# Patient Record
Sex: FEMALE | Race: ASIAN | NOT HISPANIC OR LATINO | Employment: OTHER | URBAN - METROPOLITAN AREA
[De-identification: names, ages, dates, MRNs, and addresses within clinical notes are randomized per-mention and may not be internally consistent; named-entity substitution may affect disease eponyms.]

---

## 2018-06-25 ENCOUNTER — OFFICE VISIT CONVERTED (OUTPATIENT)
Dept: INTERNAL MEDICINE | Facility: CLINIC | Age: 23
End: 2018-06-25
Attending: INTERNAL MEDICINE

## 2019-01-02 ENCOUNTER — HOSPITAL ENCOUNTER (OUTPATIENT)
Dept: URGENT CARE | Facility: CLINIC | Age: 24
Discharge: HOME | End: 2019-01-02
Attending: EMERGENCY MEDICINE

## 2019-03-11 ENCOUNTER — HOSPITAL ENCOUNTER (OUTPATIENT)
Dept: URGENT CARE | Facility: CLINIC | Age: 24
Discharge: HOME | End: 2019-03-11
Attending: EMERGENCY MEDICINE

## 2019-03-14 LAB — BACTERIA SPEC AEROBE CULT: NORMAL

## 2021-05-16 VITALS
OXYGEN SATURATION: 100 % | BODY MASS INDEX: 25.49 KG/M2 | HEART RATE: 68 BPM | SYSTOLIC BLOOD PRESSURE: 110 MMHG | HEIGHT: 61 IN | WEIGHT: 135 LBS | DIASTOLIC BLOOD PRESSURE: 68 MMHG | RESPIRATION RATE: 12 BRPM | TEMPERATURE: 97.8 F

## 2021-09-16 ENCOUNTER — HOSPITAL ENCOUNTER (INPATIENT)
Facility: HOSPITAL | Age: 26
LOS: 1 days | Discharge: HOME | End: 2021-09-17
Attending: EMERGENCY MEDICINE | Admitting: INTERNAL MEDICINE

## 2021-09-16 ENCOUNTER — APPOINTMENT (OUTPATIENT)
Dept: GENERAL RADIOLOGY | Facility: HOSPITAL | Age: 26
End: 2021-09-16

## 2021-09-16 DIAGNOSIS — J96.01 ACUTE RESPIRATORY FAILURE WITH HYPOXIA (HCC): ICD-10-CM

## 2021-09-16 DIAGNOSIS — U07.1 PNEUMONIA DUE TO COVID-19 VIRUS: Primary | ICD-10-CM

## 2021-09-16 DIAGNOSIS — J12.82 PNEUMONIA DUE TO COVID-19 VIRUS: Primary | ICD-10-CM

## 2021-09-16 LAB
ALBUMIN SERPL-MCNC: 4.1 G/DL (ref 3.5–5.2)
ALBUMIN/GLOB SERPL: 1.2 G/DL
ALP SERPL-CCNC: 83 U/L (ref 39–117)
ALT SERPL W P-5'-P-CCNC: 32 U/L (ref 1–33)
ANION GAP SERPL CALCULATED.3IONS-SCNC: 13.5 MMOL/L (ref 5–15)
AST SERPL-CCNC: 63 U/L (ref 1–32)
BASOPHILS # BLD AUTO: 0.01 10*3/MM3 (ref 0–0.2)
BASOPHILS NFR BLD AUTO: 0.2 % (ref 0–1.5)
BILIRUB SERPL-MCNC: 0.3 MG/DL (ref 0–1.2)
BUN SERPL-MCNC: 8 MG/DL (ref 6–20)
BUN/CREAT SERPL: 9.5 (ref 7–25)
CALCIUM SPEC-SCNC: 8.4 MG/DL (ref 8.6–10.5)
CHLORIDE SERPL-SCNC: 100 MMOL/L (ref 98–107)
CO2 SERPL-SCNC: 20.5 MMOL/L (ref 22–29)
CREAT SERPL-MCNC: 0.84 MG/DL (ref 0.57–1)
CRP SERPL-MCNC: 2.98 MG/DL (ref 0–0.5)
D DIMER PPP FEU-MCNC: 0.76 MG/L (FEU) (ref 0–0.59)
D-LACTATE SERPL-SCNC: 1 MMOL/L (ref 0.5–2)
D-LACTATE SERPL-SCNC: 1.2 MMOL/L (ref 0.5–2)
DEPRECATED RDW RBC AUTO: 37.1 FL (ref 37–54)
EOSINOPHIL # BLD AUTO: 0 10*3/MM3 (ref 0–0.4)
EOSINOPHIL NFR BLD AUTO: 0 % (ref 0.3–6.2)
ERYTHROCYTE [DISTWIDTH] IN BLOOD BY AUTOMATED COUNT: 12.1 % (ref 12.3–15.4)
FERRITIN SERPL-MCNC: 421.7 NG/ML (ref 13–150)
GFR SERPL CREATININE-BSD FRML MDRD: 81 ML/MIN/1.73
GLOBULIN UR ELPH-MCNC: 3.3 GM/DL
GLUCOSE SERPL-MCNC: 122 MG/DL (ref 65–99)
HCT VFR BLD AUTO: 42.9 % (ref 34–46.6)
HGB BLD-MCNC: 14.3 G/DL (ref 12–15.9)
HOLD SPECIMEN: NORMAL
HOLD SPECIMEN: NORMAL
IMM GRANULOCYTES # BLD AUTO: 0.02 10*3/MM3 (ref 0–0.05)
IMM GRANULOCYTES NFR BLD AUTO: 0.4 % (ref 0–0.5)
LDH SERPL-CCNC: 421 U/L (ref 135–214)
LYMPHOCYTES # BLD AUTO: 0.98 10*3/MM3 (ref 0.7–3.1)
LYMPHOCYTES NFR BLD AUTO: 18 % (ref 19.6–45.3)
MCH RBC QN AUTO: 27.8 PG (ref 26.6–33)
MCHC RBC AUTO-ENTMCNC: 33.3 G/DL (ref 31.5–35.7)
MCV RBC AUTO: 83.3 FL (ref 79–97)
MONOCYTES # BLD AUTO: 0.34 10*3/MM3 (ref 0.1–0.9)
MONOCYTES NFR BLD AUTO: 6.3 % (ref 5–12)
NEUTROPHILS NFR BLD AUTO: 4.09 10*3/MM3 (ref 1.7–7)
NEUTROPHILS NFR BLD AUTO: 75.1 % (ref 42.7–76)
NRBC BLD AUTO-RTO: 0 /100 WBC (ref 0–0.2)
PLATELET # BLD AUTO: 175 10*3/MM3 (ref 140–450)
PMV BLD AUTO: 9.2 FL (ref 6–12)
POTASSIUM SERPL-SCNC: 3.3 MMOL/L (ref 3.5–5.2)
PROT SERPL-MCNC: 7.4 G/DL (ref 6–8.5)
QT INTERVAL: 330 MS
RBC # BLD AUTO: 5.15 10*6/MM3 (ref 3.77–5.28)
SARS-COV-2 N GENE RESP QL NAA+PROBE: DETECTED
SODIUM SERPL-SCNC: 134 MMOL/L (ref 136–145)
WBC # BLD AUTO: 5.44 10*3/MM3 (ref 3.4–10.8)
WHOLE BLOOD HOLD SPECIMEN: NORMAL
WHOLE BLOOD HOLD SPECIMEN: NORMAL

## 2021-09-16 PROCEDURE — 94799 UNLISTED PULMONARY SVC/PX: CPT

## 2021-09-16 PROCEDURE — 82728 ASSAY OF FERRITIN: CPT | Performed by: FAMILY MEDICINE

## 2021-09-16 PROCEDURE — 93005 ELECTROCARDIOGRAM TRACING: CPT

## 2021-09-16 PROCEDURE — 93005 ELECTROCARDIOGRAM TRACING: CPT | Performed by: EMERGENCY MEDICINE

## 2021-09-16 PROCEDURE — 83605 ASSAY OF LACTIC ACID: CPT | Performed by: EMERGENCY MEDICINE

## 2021-09-16 PROCEDURE — 63710000001 DEXAMETHASONE PER 0.25 MG: Performed by: FAMILY MEDICINE

## 2021-09-16 PROCEDURE — 85025 COMPLETE CBC W/AUTO DIFF WBC: CPT | Performed by: EMERGENCY MEDICINE

## 2021-09-16 PROCEDURE — 85379 FIBRIN DEGRADATION QUANT: CPT | Performed by: FAMILY MEDICINE

## 2021-09-16 PROCEDURE — 93010 ELECTROCARDIOGRAM REPORT: CPT | Performed by: INTERNAL MEDICINE

## 2021-09-16 PROCEDURE — 71045 X-RAY EXAM CHEST 1 VIEW: CPT

## 2021-09-16 PROCEDURE — 25010000002 CEFTRIAXONE PER 250 MG: Performed by: EMERGENCY MEDICINE

## 2021-09-16 PROCEDURE — 83605 ASSAY OF LACTIC ACID: CPT | Performed by: FAMILY MEDICINE

## 2021-09-16 PROCEDURE — 94760 N-INVAS EAR/PLS OXIMETRY 1: CPT

## 2021-09-16 PROCEDURE — 83615 LACTATE (LD) (LDH) ENZYME: CPT | Performed by: FAMILY MEDICINE

## 2021-09-16 PROCEDURE — 25010000002 AZITHROMYCIN PER 500 MG: Performed by: FAMILY MEDICINE

## 2021-09-16 PROCEDURE — 36415 COLL VENOUS BLD VENIPUNCTURE: CPT

## 2021-09-16 PROCEDURE — 99285 EMERGENCY DEPT VISIT HI MDM: CPT

## 2021-09-16 PROCEDURE — 87040 BLOOD CULTURE FOR BACTERIA: CPT | Performed by: EMERGENCY MEDICINE

## 2021-09-16 PROCEDURE — 94640 AIRWAY INHALATION TREATMENT: CPT

## 2021-09-16 PROCEDURE — 86140 C-REACTIVE PROTEIN: CPT | Performed by: FAMILY MEDICINE

## 2021-09-16 PROCEDURE — 87635 SARS-COV-2 COVID-19 AMP PRB: CPT | Performed by: EMERGENCY MEDICINE

## 2021-09-16 PROCEDURE — 25010000002 ENOXAPARIN PER 10 MG: Performed by: FAMILY MEDICINE

## 2021-09-16 PROCEDURE — 99223 1ST HOSP IP/OBS HIGH 75: CPT | Performed by: FAMILY MEDICINE

## 2021-09-16 PROCEDURE — 25010000002 CEFTRIAXONE PER 250 MG: Performed by: FAMILY MEDICINE

## 2021-09-16 PROCEDURE — 25010000002 DEXAMETHASONE PER 1 MG: Performed by: EMERGENCY MEDICINE

## 2021-09-16 PROCEDURE — 80053 COMPREHEN METABOLIC PANEL: CPT | Performed by: EMERGENCY MEDICINE

## 2021-09-16 RX ORDER — ACETAMINOPHEN 500 MG
1000 TABLET ORAL ONCE
Status: DISCONTINUED | OUTPATIENT
Start: 2021-09-16 | End: 2021-09-16

## 2021-09-16 RX ORDER — SODIUM CHLORIDE 0.9 % (FLUSH) 0.9 %
10 SYRINGE (ML) INJECTION AS NEEDED
Status: DISCONTINUED | OUTPATIENT
Start: 2021-09-16 | End: 2021-09-17 | Stop reason: HOSPADM

## 2021-09-16 RX ORDER — BUDESONIDE 0.5 MG/2ML
0.5 INHALANT ORAL
Status: DISCONTINUED | OUTPATIENT
Start: 2021-09-16 | End: 2021-09-17 | Stop reason: HOSPADM

## 2021-09-16 RX ORDER — POTASSIUM CHLORIDE 750 MG/1
40 CAPSULE, EXTENDED RELEASE ORAL 2 TIMES DAILY WITH MEALS
Status: COMPLETED | OUTPATIENT
Start: 2021-09-16 | End: 2021-09-16

## 2021-09-16 RX ORDER — ARFORMOTEROL TARTRATE 15 UG/2ML
15 SOLUTION RESPIRATORY (INHALATION)
Status: DISCONTINUED | OUTPATIENT
Start: 2021-09-16 | End: 2021-09-17 | Stop reason: HOSPADM

## 2021-09-16 RX ORDER — DEXAMETHASONE SODIUM PHOSPHATE 10 MG/ML
6 INJECTION INTRAMUSCULAR; INTRAVENOUS DAILY
Status: DISCONTINUED | OUTPATIENT
Start: 2021-09-16 | End: 2021-09-17 | Stop reason: HOSPADM

## 2021-09-16 RX ORDER — AZITHROMYCIN 250 MG/1
500 TABLET, FILM COATED ORAL ONCE
Status: COMPLETED | OUTPATIENT
Start: 2021-09-16 | End: 2021-09-16

## 2021-09-16 RX ORDER — ACETAMINOPHEN 325 MG/1
975 TABLET ORAL ONCE
Status: COMPLETED | OUTPATIENT
Start: 2021-09-16 | End: 2021-09-16

## 2021-09-16 RX ORDER — GUAIFENESIN AND CODEINE PHOSPHATE 100; 10 MG/5ML; MG/5ML
5 SOLUTION ORAL EVERY 4 HOURS PRN
Status: DISCONTINUED | OUTPATIENT
Start: 2021-09-16 | End: 2021-09-17 | Stop reason: HOSPADM

## 2021-09-16 RX ORDER — ACETAMINOPHEN 325 MG/1
650 TABLET ORAL EVERY 4 HOURS PRN
Status: DISCONTINUED | OUTPATIENT
Start: 2021-09-16 | End: 2021-09-17 | Stop reason: HOSPADM

## 2021-09-16 RX ORDER — DEXAMETHASONE SODIUM PHOSPHATE 10 MG/ML
10 INJECTION INTRAMUSCULAR; INTRAVENOUS ONCE
Status: COMPLETED | OUTPATIENT
Start: 2021-09-16 | End: 2021-09-16

## 2021-09-16 RX ADMIN — SODIUM CHLORIDE 1 G: 9 INJECTION, SOLUTION INTRAVENOUS at 03:43

## 2021-09-16 RX ADMIN — POTASSIUM CHLORIDE 40 MEQ: 10 CAPSULE, COATED, EXTENDED RELEASE ORAL at 10:39

## 2021-09-16 RX ADMIN — AZITHROMYCIN MONOHYDRATE 500 MG: 500 INJECTION, POWDER, LYOPHILIZED, FOR SOLUTION INTRAVENOUS at 10:39

## 2021-09-16 RX ADMIN — ARFORMOTEROL TARTRATE 15 MCG: 15 SOLUTION RESPIRATORY (INHALATION) at 13:02

## 2021-09-16 RX ADMIN — GUAIFENESIN AND CODEINE PHOSPHATE 5 ML: 10; 100 LIQUID ORAL at 17:10

## 2021-09-16 RX ADMIN — BUDESONIDE 0.5 MG: 0.5 INHALANT ORAL at 22:29

## 2021-09-16 RX ADMIN — SODIUM CHLORIDE 1000 ML: 9 INJECTION, SOLUTION INTRAVENOUS at 03:43

## 2021-09-16 RX ADMIN — ARFORMOTEROL TARTRATE 15 MCG: 15 SOLUTION RESPIRATORY (INHALATION) at 22:29

## 2021-09-16 RX ADMIN — BUDESONIDE 0.5 MG: 0.5 INHALANT ORAL at 13:02

## 2021-09-16 RX ADMIN — SODIUM CHLORIDE 1 G: 900 INJECTION INTRAVENOUS at 16:49

## 2021-09-16 RX ADMIN — ENOXAPARIN SODIUM 40 MG: 40 INJECTION SUBCUTANEOUS at 10:39

## 2021-09-16 RX ADMIN — POTASSIUM CHLORIDE 40 MEQ: 10 CAPSULE, COATED, EXTENDED RELEASE ORAL at 16:49

## 2021-09-16 RX ADMIN — AZITHROMYCIN MONOHYDRATE 500 MG: 250 TABLET ORAL at 03:43

## 2021-09-16 RX ADMIN — DEXAMETHASONE SODIUM PHOSPHATE 10 MG: 10 INJECTION INTRAMUSCULAR; INTRAVENOUS at 03:43

## 2021-09-16 RX ADMIN — REMDESIVIR 200 MG: 100 INJECTION, POWDER, LYOPHILIZED, FOR SOLUTION INTRAVENOUS at 18:01

## 2021-09-16 RX ADMIN — DEXAMETHASONE 6 MG: 0.5 TABLET ORAL at 10:39

## 2021-09-16 RX ADMIN — ACETAMINOPHEN 975 MG: 325 TABLET ORAL at 02:29

## 2021-09-16 NOTE — CASE MANAGEMENT/SOCIAL WORK
Discharge Planning Assessment   Precious     Patient Name: Princess Larios  MRN: [id number]  Today's Date: 9/16/2021    Admit Date: 9/16/2021    Discharge Needs Assessment     Row Name 09/16/21 7114       Living Environment    Lives With  spouse    Name(s) of Who Lives With Patient  Patient reports she lives with her  Larry Young    Current Living Arrangements  home/apartment/condo    Primary Care Provided by  self    Provides Primary Care For  no one    Quality of Family Relationships  helpful;involved;supportive       Resource/Environmental Concerns    Resource/Environmental Concerns  none    Transportation Concerns  car, none       Transition Planning    Patient/Family Anticipates Transition to  home    Patient/Family Anticipated Services at Transition  durable medical equipment    Transportation Anticipated  family or friend will provide       Discharge Needs Assessment    Readmission Within the Last 30 Days  no previous admission in last 30 days    Equipment Currently Used at Home  none    Concerns to be Addressed  no discharge needs identified;denies needs/concerns at this time    Anticipated Changes Related to Illness  none    Equipment Needed After Discharge  oxygen;pulse ox;nebulizer    Discharge Coordination/Progress  Patient is covid positive as of 9/16. pt has not been vaccinated. Pt reports she lives with her  at home. Pt is currently on 2 liters of oxygen and does not use at home. Pt does not have PCP, she is agreeable to one at discharge. Pt uses [facility] as her pharmacy. pt denied any concerns with affording her medications or with transportation. pt plans to return home at discharge.        Discharge Plan    No documentation.       Continued Care and Services - Admitted Since 9/16/2021    Coordination has not been started for this encounter.         Demographic Summary     Row Name 09/16/21 1547       General Information    Admission Type  inpatient    Arrived From  home    Referral  Source  admission list    Preferred Language  English       Contact Information    Permission Granted to Share Info With  family/designee        Functional Status     Row Name 09/16/21 9570       Functional Status    Usual Activity Tolerance  excellent    Current Activity Tolerance  good       Functional Status, IADL    Medications  independent    Meal Preparation  independent    Housekeeping  independent    Laundry  independent    Shopping  independent    IADL Comments  Pt reported that her  does help her get dresed, and walk sometimes as needed at home.       Mental Status Summary    Recent Changes in Mental Status/Cognitive Functioning  no changes        Psychosocial    No documentation.       Abuse/Neglect    No documentation.       Legal    No documentation.       Substance Abuse    No documentation.       Patient Forms    No documentation.           JESSE Valencia

## 2021-09-16 NOTE — ED NOTES
WALKED PT IN ROOM FOR ABOUT 2-3 MINUTES, OXYGEN DROPPED TO 87% RA. WHEN PLACED BACK INTO STRETCHER, OXYGEN DROPPED TO 85% RA. PROVIDER NOTIFIED.      Rose Mary Gomez, PCT  09/16/21 0353

## 2021-09-16 NOTE — PLAN OF CARE
Goal Outcome Evaluation:  Plan of Care Reviewed With: patient   Pt admitted to unit from ED this morning. On 2L o2 satting 92-94%, AKBAR. No complaints this shift. Possible DC in the next 24-48 hours if remains stable on 2L o2.     Progress: improving

## 2021-09-16 NOTE — H&P
[facility]  Date: [facility]   Patient Name: Princess Larios  : 1995  MRN: [id number]  Primary Care Physician:  Rafa, No Known  Date of admission: 2021    Subjective   Subjective     Chief Complaint: Shortness of breath    HPI:    Princess Larios is a 26 y.o. female who presents to the hospital with shortness of breath cough and fever for 3 days.  Patient reportedly had a close contact exposure to COVID-19.  She has developed shortness of breath cough and fever and this is been getting progressively worse so she presented to the emergency department.  In the ER she was 85% on room air at rest.  Due to this she required supplemental oxygen.      Personal History     Past Medical History:  History reviewed. No pertinent past medical history.      Past Surgical History:  History reviewed. No pertinent surgical history.      Family History:   History reviewed. No pertinent family history.      Social History:   Social History     Tobacco Use   • Smoking status: Never Smoker   Substance Use Topics   • Alcohol use: Never   • Drug use: Never         Home Medications:       Allergies:  No Known Allergies    Review of Systems  Review of systems reviewed in its entirety and found to be negative for any changes in the last 2-4 weeks except noted in HPI or above              .    Objective   Objective     Vitals:   Temp:  [100.3 °F (37.9 °C)-103.1 °F (39.5 °C)] 100.3 °F (37.9 °C)  Heart Rate:  [79-96] 79  Resp:  [18-26] 26  BP: ()/(55-67) 100/64  Flow (L/min):  [2] 2    Physical Exam    Constitutional: Awake, alert, no acute distress, appears diaphoretic and uncomfortable   Eyes: Pupils equal, sclerae anicteric, no conjunctival injection   HENT: NCAT, mucous membranes moist   Neck: Supple, no thyromegaly, no lymphadenopathy, trachea midline   Respiratory: bilateral rales, nonlabored respirations    Cardiovascular: RRR, no murmurs, rubs, or gallops, palpable pedal pulses  bilaterally   Gastrointestinal: Positive bowel sounds, soft, nontender, nondistended   Musculoskeletal: No bilateral ankle edema, no clubbing or cyanosis to extremities   Psychiatric: Appropriate affect, cooperative   Neurologic: Oriented x 3, strength symmetric in all extremities, Cranial Nerves grossly intact to confrontation, speech clear   Skin: No rashes     Result Review    Result Review:  I have personally reviewed the results from the time of this admission to 9/16/2021 05:20 EDT and agree with these findings:  [x]  Laboratory  []  Microbiology  [x]  Radiology  [x]  EKG/Telemetry   []  Cardiology/Vascular   []  Pathology  []  Old records  []  Other:      Assessment/Plan   Assessment / Plan     Assessment/Plan:   • Acute hypoxic respiratory failure  • COVID-19 pneumonia  • Obesity with a BMI of 29  • Hyponatremia  • Hypokalemia    Plan:  Patient is admitted to a monitored bed for further medical management  Systemic corticosteroids  Supplemental oxygen. Titrate oxygen for saturation of 92 % or greater.    I reviewed the patient's chest imaging.  Evidence of multifocal pneumonia  Follow CBC for surveillance of acute blood loss or thrombocytopenia   Metabolic panel in the morning to evaluate electrolytes and renal function  Reviewed today's labs  Reviewed telemetry  Discussed with ER physician  DVT Prophylaxis: Lovenox  CODE STATUS: Full code  Risk of primary complaint: patient is at significant risk of morbidity if primary complaint is not treated especially considering the patient's comorbidities.  DVT prophylaxis:  Medical DVT prophylaxis orders are present.    CODE STATUS:    Code Status: CPR  Medical Interventions (Level of Support Prior to Arrest): Full      Admission Status:  I believe this patient meets inpatient status.    Electronically signed by Joel Michaels DO, 09/16/21, 5:20 AM EDT.

## 2021-09-16 NOTE — ED PROVIDER NOTES
Time: 2:40 AM EDT  Arrived by: private car  History provided by: pt  History is limited by: N/A     History of Present Illness:  Patient is a 26 y.o. year old female that presents to the emergency department with SOB.    Pt states she is positive for COVID-19 for about one week. Pt complains of SOB, diarrhea, fatigue, and fever. Pt denies any nausea or vomiting.      History provided by:  Patient   used: No    Shortness of Breath  Severity:  Moderate  Onset quality:  Gradual  Duration:  1 week  Timing:  Constant  Progression:  Worsening  Chronicity:  New  Context comment:  COVID-19  Relieved by:  Nothing  Worsened by:  Nothing  Ineffective treatments: OTC.  Associated symptoms: fever    Associated symptoms: no abdominal pain, no ear pain, no headaches, no neck pain, no rash, no sore throat and no vomiting    Fatigue  Associated symptoms: diarrhea, fatigue, fever and shortness of breath    Associated symptoms: no abdominal pain, no congestion, no ear pain, no headaches, no nausea, no rash, no rhinorrhea, no sore throat and no vomiting    Fever  Associated symptoms: diarrhea    Associated symptoms: no chills, no confusion, no congestion, no dysuria, no ear pain, no headaches, no nausea, no rash, no rhinorrhea, no sore throat and no vomiting        Similar Symptoms Previously: none  Recently seen: not recently seen in this ED    Patient Care Team  Primary Care Provider: Provider, No Known    Past Medical History:     No Known Allergies  History reviewed. No pertinent past medical history.  History reviewed. No pertinent surgical history.  History reviewed. No pertinent family history.    Home Medications:  Prior to Admission medications    Not on File        Social History:   Social History     Tobacco Use   • Smoking status: Never Smoker   Substance Use Topics   • Alcohol use: Never   • Drug use: Never     Recent travel: not applicable     Review of Systems:  Review of Systems   Constitutional:  "Positive for fatigue and fever. Negative for chills.   HENT: Negative for congestion, ear pain, rhinorrhea, sore throat, tinnitus and trouble swallowing.    Eyes: Negative for photophobia and pain.   Respiratory: Positive for shortness of breath. Negative for choking.    Gastrointestinal: Positive for diarrhea. Negative for abdominal pain, nausea and vomiting.   Endocrine: Negative for polydipsia.   Genitourinary: Negative for difficulty urinating, dysuria and hematuria.   Musculoskeletal: Negative for back pain, joint swelling and neck pain.   Skin: Negative for rash.   Neurological: Negative for dizziness, seizures, speech difficulty, weakness, light-headedness, numbness and headaches.   Hematological: Negative for adenopathy.   Psychiatric/Behavioral: Negative for behavioral problems, confusion, self-injury and suicidal ideas.        Physical Exam:  /58   Pulse 80   Temp 100.3 °F (37.9 °C) (Oral)   Resp 26   Ht 152.4 cm (60\")   Wt 67.9 kg (149 lb 11.1 oz)   LMP 09/06/2021   SpO2 98%   BMI 29.23 kg/m²     Physical Exam  Vitals and nursing note reviewed.   Constitutional:       General: She is awake.      Appearance: Normal appearance. She is ill-appearing.   HENT:      Head: Normocephalic and atraumatic.      Right Ear: External ear normal.      Left Ear: External ear normal.      Nose: Nose normal.      Mouth/Throat:      Mouth: Mucous membranes are moist.      Pharynx: Oropharynx is clear.   Eyes:      General: Lids are normal.      Extraocular Movements: Extraocular movements intact.      Conjunctiva/sclera: Conjunctivae normal.      Pupils: Pupils are equal, round, and reactive to light.   Cardiovascular:      Rate and Rhythm: Normal rate and regular rhythm.      Pulses: Normal pulses.      Heart sounds: Normal heart sounds.   Pulmonary:      Effort: Pulmonary effort is normal.      Breath sounds: Normal air entry. Rhonchi present.   Abdominal:      Palpations: Abdomen is soft.   Musculoskeletal: "         General: Normal range of motion.      Cervical back: Full passive range of motion without pain, normal range of motion and neck supple.   Skin:     General: Skin is warm and dry.   Neurological:      General: No focal deficit present.      Mental Status: She is alert and oriented to person, place, and time. Mental status is at baseline.      Cranial Nerves: Cranial nerves are intact.      Sensory: Sensation is intact.      Motor: Motor function is intact.      Coordination: Coordination is intact.   Psychiatric:         Attention and Perception: Attention and perception normal.         Mood and Affect: Mood and affect normal.         Speech: Speech normal.         Behavior: Behavior normal. Behavior is cooperative.         Thought Content: Thought content normal.         Cognition and Memory: Cognition and memory normal.                Medications in the Emergency Department:  Medications   sodium chloride 0.9 % flush 10 mL (has no administration in time range)   enoxaparin (LOVENOX) syringe 40 mg (has no administration in time range)   dexamethasone (DECADRON) tablet 6 mg (has no administration in time range)     Or   dexamethasone (DECADRON) injection 6 mg (has no administration in time range)   cefTRIAXone (ROCEPHIN) 1 g/100 mL 0.9% NS (MBP) (has no administration in time range)   AZITHROMYCIN 500 MG/250 ML 0.9% NS IVPB (vial-mate) (has no administration in time range)   arformoterol (BROVANA) nebulizer solution 15 mcg (has no administration in time range)   budesonide (PULMICORT) nebulizer solution 0.5 mg (has no administration in time range)   acetaminophen (TYLENOL) tablet 975 mg (975 mg Oral Given 9/16/21 0229)   sodium chloride 0.9 % bolus 1,000 mL (0 mL Intravenous Stopped 9/16/21 0413)   cefTRIAXone (ROCEPHIN) 1 g/100 mL 0.9% NS (MBP) (0 g Intravenous Stopped 9/16/21 0413)   azithromycin (ZITHROMAX) tablet 500 mg (500 mg Oral Given 9/16/21 0343)   dexamethasone (DECADRON) injection 10 mg (10 mg  Intravenous Given 9/16/21 0343)        Labs  Lab Results (last 24 hours)     Procedure Component Value Units Date/Time    CBC & Differential [[id number]]  (Abnormal) Collected: 09/16/21 0234    Specimen: Blood from Arm, Left Updated: 09/16/21 0253    Narrative:      The following orders were created for panel order CBC & Differential.  Procedure                               Abnormality         Status                     ---------                               -----------         ------                     CBC Auto Differential[[id number]]        Abnormal            Final result                 Please view results for these tests on the individual orders.    Comprehensive Metabolic Panel [[id number]]  (Abnormal) Collected: 09/16/21 0234    Specimen: Blood from Arm, Left Updated: 09/16/21 0314     Glucose 122 mg/dL      BUN 8 mg/dL      Creatinine 0.84 mg/dL      Sodium 134 mmol/L      Potassium 3.3 mmol/L      Chloride 100 mmol/L      CO2 20.5 mmol/L      Calcium 8.4 mg/dL      Total Protein 7.4 g/dL      Albumin 4.10 g/dL      ALT (SGPT) 32 U/L      AST (SGOT) 63 U/L      Alkaline Phosphatase 83 U/L      Total Bilirubin 0.3 mg/dL      eGFR Non African Amer 81 mL/min/1.73      Globulin 3.3 gm/dL      A/G Ratio 1.2 g/dL      BUN/Creatinine Ratio 9.5     Anion Gap 13.5 mmol/L     Narrative:      GFR Normal >60  Chronic Kidney Disease <60  Kidney Failure <15      Lactic Acid, Plasma [[id number]]  (Normal) Collected: 09/16/21 0234    Specimen: Blood from Arm, Left Updated: 09/16/21 0310     Lactate 1.2 mmol/L     Blood Culture - Blood, Arm, Left [[id number]] Collected: 09/16/21 0234    Specimen: Blood from Arm, Left Updated: 09/16/21 0248    CBC Auto Differential [[id number]]  (Abnormal) Collected: 09/16/21 0234    Specimen: Blood from Arm, Left Updated: 09/16/21 0253     WBC 5.44 10*3/mm3      RBC 5.15 10*6/mm3      Hemoglobin 14.3 g/dL      Hematocrit 42.9 %      MCV 83.3 fL      MCH 27.8 pg      MCHC 33.3 g/dL      RDW  12.1 %      RDW-SD 37.1 fl      MPV 9.2 fL      Platelets 175 10*3/mm3      Neutrophil % 75.1 %      Lymphocyte % 18.0 %      Monocyte % 6.3 %      Eosinophil % 0.0 %      Basophil % 0.2 %      Immature Grans % 0.4 %      Neutrophils, Absolute 4.09 10*3/mm3      Lymphocytes, Absolute 0.98 10*3/mm3      Monocytes, Absolute 0.34 10*3/mm3      Eosinophils, Absolute 0.00 10*3/mm3      Basophils, Absolute 0.01 10*3/mm3      Immature Grans, Absolute 0.02 10*3/mm3      nRBC 0.0 /100 WBC     Ferritin [[id number]] Collected: 09/16/21 0234    Specimen: Blood from Arm, Left Updated: 09/16/21 0525    Lactate Dehydrogenase [[id number]] Collected: 09/16/21 0234    Specimen: Blood from Arm, Left Updated: 09/16/21 0525    D-dimer, Quantitative [[id number]]  (Abnormal) Collected: 09/16/21 0234    Specimen: Blood from Arm, Left Updated: 09/16/21 0538     D-Dimer, Quantitative 0.76 mg/L (FEU)     Narrative:      Effective 6/30/09 new normal reference range: <= 0.59 mg/L FEU  Increases in D-dimer concentration observed with  thromboembolic events can be variable due to localization,  size and age of the thrombus. Therefore, a thromboembolic  event cannot be diagnosed with certainty on the basis of the  reference range.  D-dimers may also be elevated for a variety of disorders   including: advanced age, pregnancy, coronary disease, cancer,  liver disease, infection, inflammation, hematoma, DIC, trauma,  post surgery, diabetes, thrombolytic therapy, stress, and  general hospitalization. D-dimer levels may be decreased in  patients on anticoagulant therapy.    C-reactive Protein [[id number]] Collected: 09/16/21 0234    Specimen: Blood from Arm, Left Updated: 09/16/21 0525    COVID-19,CEPHEID/JORGE/BDMAX,COR/ALMA ROSA/PAD/PATY IN-HOUSE(OR EMERGENT/ADD-ON),NP SWAB IN TRANSPORT MEDIA 3-4 HR TAT, RT-PCR - Swab, Nasopharynx [[id number]] Collected: 09/16/21 0237    Specimen: Swab from Nasopharynx Updated: 09/16/21 0248    Blood Culture - Blood, Arm, Right  [[id number]] Collected: 09/16/21 0248    Specimen: Blood from Arm, Right Updated: 09/16/21 0257           Imaging:  XR Chest 1 View    Result Date: 9/16/2021  PROCEDURE: XR CHEST 1 VW  COMPARISON: None  INDICATIONS: SHORTNESS OF BREATH, FATIGUE, FEVER  FINDINGS:  There are patchy airspace opacities in both lungs.  No pneumothorax or large pleural effusion is seen.  Cardiac silhouette appears mildly enlarged, but is exaggerated by portable AP technique.  CONCLUSION: Patchy airspace opacities in both lungs, concerning for multifocal pneumonia.       MARCELA FARRELL MD       Electronically Signed and Approved By: MARCELA FARRELL MD on 9/16/2021 at 3:05               Procedures:  Procedures    Progress  ED Course as of Sep 16 0708   Thu Sep 16, 2021   0241 EKG: Sinus rhythm at 92, nml P wave, nml QRS, non specific ST changes, nml QT - no previous    [RK]      ED Course User Index  [RK] Uma Valerio                            Medical Decision Making:  MDM   Sepsis criteria was met in the emergency department and the Sepsis protocol (including antibiotic administration) was initiated.      SIRS criteria considered:   1.  Temperature > 100.4 or <98.6    2.  Heart Rate > 90    3.  Respiratory Rate > 22    4.  WBC > 12K or <4K.             Severe Sepsis:     Respiratory: Mechanical Ventilation or Bipap  Hypotension: SBP > 90 or MAP < 65  Renal: Creatinine > 2  Metabolic: Lactic Acid > 2  Hematologic: Platelets < 100K or INR > 1.5  Hepatic: BILI  >  2  CNS: Sudden AMS     Septic Shock:     Severe Sepsis + Persistent hypotension or Lactic Acid > 4     Normal saline bolus, Antibiotics, and final disposition was based on these definitions.        Sepsis was recognized at 0315.    Antibiotics were ordered.     30 cc/kg bolus was indicated.      The patient was ordered 1 L of fluids.    Total Critical Care time of 31 minutes. Total critical care time documented does not include time spent on separately billed procedures for  services of nurses or physician assistants. I personally saw and examined the patient. I have reviewed all diagnostic interpretations and treatment plans as written. I was present for the key portions of any procedures performed and the inclusive time noted in any critical care statement. Critical care time includes patient management by me, time spent at the patients bedside,  time to review lab and imaging results, discussing patient care, documentation in the medical record, and time spent with family or caregiver.    Final diagnoses:   Pneumonia due to COVID-19 virus   Acute respiratory failure with hypoxia (CMS/[facility])        Disposition:  ED Disposition     ED Disposition Condition Comment    Decision to Admit  Level of Care: Telemetry [5]   Diagnosis: Pneumonia due to COVID-19 virus [[id number]]   Admitting Physician: YOLY RINALDI [[id number]]   Attending Physician: YOLY RINALDI [[id number]]   Isolate for COVID?: Yes [1]   Certification: I Certify That Inpatient Hospital Services Are Medically Necessary For Greater Than 2 Midnights            Documentation assistance provided by Uma Valerio acting as scribe for Samuel Alejandro MD. Information recorded by the scribe was done at my direction and has been verified and validated by me.          Uma Valerio  09/16/21 0306       Uma Valerio  09/16/21 0316       Samuel Alejandro MD  09/16/21 0029

## 2021-09-16 NOTE — PAYOR COMM NOTE
"Princess Ric (26 y.o. Female)     Date of Birth Social Security Number Address Home Phone MRN    1995  [address] [place] [address] [id number]    [employer] Marital Status          None Single       Admission Date Admission Type Admitting Provider Attending Provider Department, Room/Bed    9/16/21 Emergency Jos Cedillo MD Klimchak, Nicholas, MD [address], 3016/1    Discharge Date Discharge Disposition Discharge Destination                       Attending Provider: Jos Cedillo MD    Allergies: No Known Allergies    Isolation: Contact Air   Infection: COVID (confirmed) (09/16/21)   Code Status: CPR    Ht: 152.4 cm (60\")   Wt: 67.9 kg (149 lb 11.1 oz)    Admission Cmt: None   Principal Problem: None                Active Insurance as of 9/16/2021     Primary Coverage     Payor Plan Insurance Group Employer/Plan Group    [employer] [facility] [id number]     Payor Plan Address Payor Plan Phone Number Payor Plan Fax Number Effective Dates    [address] [phone]  9/16/2021 - None Entered    [facility] [id number]       Subscriber Name Subscriber Birth Date Member ID       SAKSHI NEWTON 7/23/1995 [id number]                 Emergency Contacts      (Rel.) Home Phone Work Phone Mobile Phone    SAKSHI NEWTON (Spouse) [phone] -- [phone]            {Documentation Categories:[id number]}  "

## 2021-09-17 ENCOUNTER — TELEPHONE (OUTPATIENT)
Dept: INTERNAL MEDICINE | Facility: CLINIC | Age: 26
End: 2021-09-17

## 2021-09-17 ENCOUNTER — READMISSION MANAGEMENT (OUTPATIENT)
Dept: CALL CENTER | Facility: HOSPITAL | Age: 26
End: 2021-09-17

## 2021-09-17 VITALS
BODY MASS INDEX: 29.39 KG/M2 | OXYGEN SATURATION: 91 % | HEIGHT: 60 IN | RESPIRATION RATE: 28 BRPM | SYSTOLIC BLOOD PRESSURE: 101 MMHG | WEIGHT: 149.69 LBS | HEART RATE: 72 BPM | DIASTOLIC BLOOD PRESSURE: 50 MMHG | TEMPERATURE: 99.2 F

## 2021-09-17 PROBLEM — D89.831 CYTOKINE RELEASE SYNDROME, GRADE 1: Status: ACTIVE | Noted: 2021-09-17

## 2021-09-17 LAB
ALBUMIN SERPL-MCNC: 3.5 G/DL (ref 3.5–5.2)
ALBUMIN/GLOB SERPL: 1.1 G/DL
ALP SERPL-CCNC: 71 U/L (ref 39–117)
ALT SERPL W P-5'-P-CCNC: 41 U/L (ref 1–33)
ANION GAP SERPL CALCULATED.3IONS-SCNC: 10.8 MMOL/L (ref 5–15)
AST SERPL-CCNC: 49 U/L (ref 1–32)
BASOPHILS # BLD AUTO: 0.02 10*3/MM3 (ref 0–0.2)
BASOPHILS NFR BLD AUTO: 0.2 % (ref 0–1.5)
BILIRUB SERPL-MCNC: 0.2 MG/DL (ref 0–1.2)
BUN SERPL-MCNC: 14 MG/DL (ref 6–20)
BUN/CREAT SERPL: 20.9 (ref 7–25)
CALCIUM SPEC-SCNC: 8.8 MG/DL (ref 8.6–10.5)
CHLORIDE SERPL-SCNC: 111 MMOL/L (ref 98–107)
CO2 SERPL-SCNC: 19.2 MMOL/L (ref 22–29)
CREAT SERPL-MCNC: 0.67 MG/DL (ref 0.57–1)
DEPRECATED RDW RBC AUTO: 38.8 FL (ref 37–54)
EOSINOPHIL # BLD AUTO: 0.02 10*3/MM3 (ref 0–0.4)
EOSINOPHIL NFR BLD AUTO: 0.2 % (ref 0.3–6.2)
ERYTHROCYTE [DISTWIDTH] IN BLOOD BY AUTOMATED COUNT: 12.5 % (ref 12.3–15.4)
GFR SERPL CREATININE-BSD FRML MDRD: 106 ML/MIN/1.73
GLOBULIN UR ELPH-MCNC: 3.3 GM/DL
GLUCOSE SERPL-MCNC: 134 MG/DL (ref 65–99)
HCT VFR BLD AUTO: 40.3 % (ref 34–46.6)
HGB BLD-MCNC: 13.5 G/DL (ref 12–15.9)
IMM GRANULOCYTES # BLD AUTO: 0.04 10*3/MM3 (ref 0–0.05)
IMM GRANULOCYTES NFR BLD AUTO: 0.4 % (ref 0–0.5)
LYMPHOCYTES # BLD AUTO: 1.09 10*3/MM3 (ref 0.7–3.1)
LYMPHOCYTES NFR BLD AUTO: 10 % (ref 19.6–45.3)
MAGNESIUM SERPL-MCNC: 2 MG/DL (ref 1.6–2.6)
MCH RBC QN AUTO: 28.8 PG (ref 26.6–33)
MCHC RBC AUTO-ENTMCNC: 33.5 G/DL (ref 31.5–35.7)
MCV RBC AUTO: 85.9 FL (ref 79–97)
MONOCYTES # BLD AUTO: 0.87 10*3/MM3 (ref 0.1–0.9)
MONOCYTES NFR BLD AUTO: 8 % (ref 5–12)
NEUTROPHILS NFR BLD AUTO: 8.9 10*3/MM3 (ref 1.7–7)
NEUTROPHILS NFR BLD AUTO: 81.2 % (ref 42.7–76)
NRBC BLD AUTO-RTO: 0 /100 WBC (ref 0–0.2)
PLATELET # BLD AUTO: 215 10*3/MM3 (ref 140–450)
PMV BLD AUTO: 10.1 FL (ref 6–12)
POTASSIUM SERPL-SCNC: 4.2 MMOL/L (ref 3.5–5.2)
PROT SERPL-MCNC: 6.8 G/DL (ref 6–8.5)
RBC # BLD AUTO: 4.69 10*6/MM3 (ref 3.77–5.28)
RBC MORPH BLD: NORMAL
SMALL PLATELETS BLD QL SMEAR: ADEQUATE
SODIUM SERPL-SCNC: 141 MMOL/L (ref 136–145)
WBC # BLD AUTO: 10.94 10*3/MM3 (ref 3.4–10.8)
WBC MORPH BLD: NORMAL

## 2021-09-17 PROCEDURE — 25010000002 CEFTRIAXONE PER 250 MG: Performed by: FAMILY MEDICINE

## 2021-09-17 PROCEDURE — 85025 COMPLETE CBC W/AUTO DIFF WBC: CPT | Performed by: FAMILY MEDICINE

## 2021-09-17 PROCEDURE — 94799 UNLISTED PULMONARY SVC/PX: CPT

## 2021-09-17 PROCEDURE — 83735 ASSAY OF MAGNESIUM: CPT | Performed by: INTERNAL MEDICINE

## 2021-09-17 PROCEDURE — 25010000002 ENOXAPARIN PER 10 MG: Performed by: FAMILY MEDICINE

## 2021-09-17 PROCEDURE — 25010000002 AZITHROMYCIN PER 500 MG: Performed by: FAMILY MEDICINE

## 2021-09-17 PROCEDURE — 80053 COMPREHEN METABOLIC PANEL: CPT | Performed by: FAMILY MEDICINE

## 2021-09-17 PROCEDURE — 63710000001 DEXAMETHASONE PER 0.25 MG: Performed by: FAMILY MEDICINE

## 2021-09-17 PROCEDURE — 85007 BL SMEAR W/DIFF WBC COUNT: CPT | Performed by: FAMILY MEDICINE

## 2021-09-17 PROCEDURE — 99239 HOSP IP/OBS DSCHRG MGMT >30: CPT | Performed by: INTERNAL MEDICINE

## 2021-09-17 RX ORDER — DEXAMETHASONE 6 MG/1
6 TABLET ORAL
Qty: 9 TABLET | Refills: 0 | Status: SHIPPED | OUTPATIENT
Start: 2021-09-17 | End: 2022-07-29

## 2021-09-17 RX ORDER — ALBUTEROL SULFATE 1.25 MG/3ML
1 SOLUTION RESPIRATORY (INHALATION) EVERY 6 HOURS PRN
Qty: 120 EACH | Refills: 0 | Status: SHIPPED | OUTPATIENT
Start: 2021-09-17 | End: 2022-07-29

## 2021-09-17 RX ORDER — GUAIFENESIN AND CODEINE PHOSPHATE 100; 10 MG/5ML; MG/5ML
5 SOLUTION ORAL 4 TIMES DAILY PRN
Qty: 30 ML | Refills: 0 | Status: SHIPPED | OUTPATIENT
Start: 2021-09-17 | End: 2022-07-29

## 2021-09-17 RX ORDER — BUDESONIDE AND FORMOTEROL FUMARATE DIHYDRATE 160; 4.5 UG/1; UG/1
2 AEROSOL RESPIRATORY (INHALATION)
Qty: 1 EACH | Refills: 0 | Status: SHIPPED | OUTPATIENT
Start: 2021-09-17 | End: 2022-07-29

## 2021-09-17 RX ADMIN — BUDESONIDE 0.5 MG: 0.5 INHALANT ORAL at 09:02

## 2021-09-17 RX ADMIN — SODIUM CHLORIDE 1 G: 900 INJECTION INTRAVENOUS at 11:27

## 2021-09-17 RX ADMIN — ARFORMOTEROL TARTRATE 15 MCG: 15 SOLUTION RESPIRATORY (INHALATION) at 09:02

## 2021-09-17 RX ADMIN — DEXAMETHASONE 6 MG: 0.5 TABLET ORAL at 08:54

## 2021-09-17 RX ADMIN — ENOXAPARIN SODIUM 40 MG: 40 INJECTION SUBCUTANEOUS at 08:54

## 2021-09-17 RX ADMIN — AZITHROMYCIN MONOHYDRATE 500 MG: 500 INJECTION, POWDER, LYOPHILIZED, FOR SOLUTION INTRAVENOUS at 08:54

## 2021-09-17 RX ADMIN — ACETAMINOPHEN 650 MG: 325 TABLET ORAL at 00:37

## 2021-09-17 NOTE — DISCHARGE SUMMARY
[facility]         HOSPITALIST  DISCHARGE SUMMARY    Patient Name: Princess Larios  : 1995  MRN: [id number]    Date of Admission: 2021  Date of Discharge: 2021  Primary Care Physician: Provider, No Known    Consults     Date and Time Order Name Status Description    2021  4:17 AM Hospitalist (on-call MD unless specified) Completed           Active and Resolved Hospital Problems:  Active Hospital Problems    Diagnosis POA   • Cytokine release syndrome, grade 1 [D89.831] Unknown   • Pneumonia due to COVID-19 virus [U07.1, J12.82] Yes      Resolved Hospital Problems   No resolved problems to display.   Acute hypoxemia  COVID-19 pneumonia  Obesity with BMI 29  Hyponatremia  Hypokalemia  Leukocytosis  Transaminitis    Hospital Course     Hospital Course:  Princess Larios is a 26 y.o. unvaccinated female who presente to the hospital with shortness of breath, cough and fever for 3 days.  Patient reportedly had a close contact exposure to COVID-19.  She has developed shortness of breath cough and fever and this is been getting progressively worse so she presented to the emergency department.  In the ER she was 85% on room air at rest.    She was admitted for further care.  Covid test returned positive.  She was started on steroids, remdesivir, and breathing treatments.  Clinically, she improved and never required more than 2 L nasal cannula.  She had a walk test on the day of discharge and will require oxygen going forward.  She was discharged home in stable condition on 2021.  She was given a nebulizer and oxygen at discharge as well as inhalers and steroids.  She is given strict instructions on reasons to return to the ED and recommend she get a pulse oximeter to monitor her oxygen at home.  Recommend follow-up with PCP in 1 week.      Day of Discharge     Vital Signs:  Temp:  [98.1 °F (36.7 °C)-99.4 °F (37.4 °C)] 98.9 °F (37.2 °C)  Heart Rate:  [69-91] 77  Resp:  [18-28] 28  BP:  ()/(51-64) 93/51  Flow (L/min):  [2-3] 3  Physical Exam:   Gen: NAD, WDWN, appears fatigued no acute distress  ENT: PERRL, EOMI   CV: RRR no MRG  Pulm: CTAB, nasal cannula in place, no w/r/r  GI: Abd soft, NTND, +bs  Neuro: Moving all extremities spontaneously, CN II-XII grossly intact   Psych: A&O*3, normal mood and affect  Skin: No lesions or rashes noted      Discharge Details        Discharge Medications      New Medications      Instructions Start Date   albuterol 1.25 MG/3ML nebulizer solution  Commonly known as: ACCUNEB   1.25 mg, Nebulization, Every 6 Hours PRN      budesonide-formoterol 160-4.5 MCG/ACT inhaler  Commonly known as: Symbicort   2 puffs, Inhalation, 2 Times Daily - RT      dexamethasone 6 MG tablet  Commonly known as: DECADRON   6 mg, Oral, Daily With Breakfast      guaiFENesin-codeine 100-10 MG/5ML liquid  Commonly known as: guaiFENesin AC   5 mL, Oral, 4 Times Daily PRN             No Known Allergies    Discharge Disposition:  Home or Self Care    Diet:  Hospital:  Diet Order   Procedures   • Diet Regular; Consistent Carbohydrate       Discharge Activity:   Activity Instructions     Activity as Tolerated            CODE STATUS:  Code Status and Medical Interventions:   Ordered at: 09/16/21 0455     Code Status:    CPR     Medical Interventions (Level of Support Prior to Arrest):    Full         No future appointments.    Additional Instructions for the Follow-ups that You Need to Schedule     Discharge Follow-up with PCP   As directed       Currently Documented PCP:    Provider, No Known    PCP Phone Number:    None     Follow Up Details: 3-5 days               Pertinent  and/or Most Recent Results     PROCEDURES:   None    LAB RESULTS:      Lab 09/17/21  0812 09/16/21  1025 09/16/21  0234   WBC 10.94*  --  5.44   HEMOGLOBIN 13.5  --  14.3   HEMATOCRIT 40.3  --  42.9   PLATELETS 215  --  175   NEUTROS ABS 8.90*  --  4.09   IMMATURE GRANS (ABS) 0.04  --  0.02   LYMPHS ABS 1.09  --  0.98    MONOS ABS 0.87  --  0.34   EOS ABS 0.02  --  0.00   MCV 85.9  --  83.3   CRP  --   --  2.98*   LACTATE  --  1.0 1.2   LDH  --   --  421*         Lab 09/17/21  0812 09/16/21  0234   SODIUM 141 134*   POTASSIUM 4.2 3.3*   CHLORIDE 111* 100   CO2 19.2* 20.5*   ANION GAP 10.8 13.5   BUN 14 8   CREATININE 0.67 0.84   GLUCOSE 134* 122*   CALCIUM 8.8 8.4*   MAGNESIUM 2.0  --          Lab 09/17/21  0812 09/16/21  0234   TOTAL PROTEIN 6.8 7.4   ALBUMIN 3.50 4.10   GLOBULIN 3.3 3.3   ALT (SGPT) 41* 32   AST (SGOT) 49* 63*   BILIRUBIN 0.2 0.3   ALK PHOS 71 83                 Lab 09/16/21  0234   FERRITIN 421.70*         Brief Urine Lab Results     None        Microbiology Results (last 10 days)     Procedure Component Value - Date/Time    Blood Culture - Blood, Arm, Right [[id number]] Collected: 09/16/21 0248    Lab Status: Preliminary result Specimen: Blood from Arm, Right Updated: 09/17/21 0300     Blood Culture No growth at 24 hours    COVID-19,CEPHEID/JORGE/BDMAX,COR/ALMA ROSA/PAD/PATY IN-HOUSE(OR EMERGENT/ADD-ON),NP SWAB IN TRANSPORT MEDIA 3-4 HR TAT, RT-PCR - Swab, Nasopharynx [[id number]]  (Abnormal) Collected: 09/16/21 0237    Lab Status: Final result Specimen: Swab from Nasopharynx Updated: 09/16/21 1014     COVID19 Detected    Narrative:      Fact sheet for providers: https://www.fda.gov/media/243497/download     Fact sheet for patients: https://www.fda.gov/media/068753/download  Presumptive Positive: additional testing may be indicated if it is necessary to differentiate between SARS-CoV-2 and other Sarbecovirus, for epidemiological purposes, or clinical management.    Blood Culture - Blood, Arm, Left [[id number]] Collected: 09/16/21 0234    Lab Status: Preliminary result Specimen: Blood from Arm, Left Updated: 09/17/21 0300     Blood Culture No growth at 24 hours                           Labs Pending at Discharge:  Pending Labs     Order Current Status    Blood Culture - Blood, Arm, Left Preliminary result    Blood Culture  - Blood, Arm, Right Preliminary result            Time spent on Discharge including face to face service:  34 minutes    Electronically signed by Jos Angulo MD, 09/17/21, 12:46 PM EDT.

## 2021-09-17 NOTE — OUTREACH NOTE
Prep Survey      Responses   [facility] discharged [facility]?  [facility]   Is LACE score < 7 ?  Yes   Emergency Room discharge w/ pulse ox?  No   Eligibility  TCM   Hospital  [facility]   Date of Admission  09/16/21   Date of Discharge  09/17/21   Discharge Disposition  Home or Self Care   Discharge diagnosis  PNA due to Covid    Does the patient have one of the following disease processes/diagnoses(primary or secondary)?  COVID-19   Does the patient have Home health ordered?  No   Is there a DME ordered?  Yes   What DME was ordered?  [place] for home O2   Prep survey completed?  Yes          Tg Wasserman RN

## 2021-09-17 NOTE — NURSING NOTE
Exercise Oximetry    Patient Name:Princess Larios   MRN: [id number]   Date: 09/17/21             ROOM AIR BASELINE   SpO2% 90   Heart Rate 86   Blood Pressure      EXERCISE ON ROOM AIR SpO2% EXERCISE ON O2 @ 3 LPM SpO2%   1 MINUTE 91 1 MINUTE    2 MINUTES 88 2 MINUTES    3 MINUTES  3 MINUTES 91   4 MINUTES  4 MINUTES 94   5 MINUTES  5 MINUTES 90   6 MINUTES  6 MINUTES 92              Distance Walked  50 feet Distance Walked   Dyspnea (Gisel Scale)  Dyspnea (Gisel Scale)   Fatigue (Gisel Scale)   Fatigue (Gisel Scale)   SpO2% Post Exercise   SpO2% Post Exercise   HR Post Exercise   HR Post Exercise   Time to Recovery  3 minutes Time to Recovery     Comments:

## 2021-09-17 NOTE — SIGNIFICANT NOTE
09/17/21 1522   Plan   Final Note Cm called pts pharmacy to verify medications. Per pharmacist, pt does not have updated insurance information in system and unable to get price for pt at this time. Cm updated pt and encouraged her to take insurance cards with her when picking up her medications. Pt provided with O2 tank from [employer] and pulse ox. New pcp setup with Alfredo Bui. 9/20. Pt denies any dc needs.

## 2021-09-17 NOTE — PLAN OF CARE
Goal Outcome Evaluation:  Plan of Care Reviewed With: patient   Pt on 3L o2. Failed walk test, required 3L o2 to stay above 90%. No complaints this shift, has orders to discharge with home o2.      Progress: improving

## 2021-09-17 NOTE — TELEPHONE ENCOUNTER
Caller: LOVE    Relationship to patient:     Best call back number: [phone]    New or established patient?  [x] New  [] Established    Date of discharge: 09/17/21    Facility discharged from: [facility]    Diagnosis/Symptoms: COVID, ASYMPTOMATIC

## 2021-09-17 NOTE — SIGNIFICANT NOTE
09/17/21 1424   Plan   Plan Comments Patient to discharge home today per MD.  Pt will need home oxygen and nebulizer. Pt is agreeable to [facility]. SW sent referral to [facility]. Patient denied any other discharge needs.   Final Discharge Disposition Code 01 - home or self-care   Final Note Patient to discharge home today independently. Pt reports her  will be here to pick her up.

## 2021-09-17 NOTE — PLAN OF CARE
"Goal Outcome Evaluation:   Patient had low grade fever this shift, medicated per mar due to \"not feeling\" per patient. Otherwise stable, encouraged patient to lay prone this shift. No follow up needs at this time.               "

## 2021-09-20 ENCOUNTER — TRANSITIONAL CARE MANAGEMENT TELEPHONE ENCOUNTER (OUTPATIENT)
Dept: CALL CENTER | Facility: HOSPITAL | Age: 26
End: 2021-09-20

## 2021-09-20 NOTE — OUTREACH NOTE
Call Center TCM Note      Responses   [facility] patient discharged from?  [facility]   Does the patient have one of the following disease processes/diagnoses(primary or secondary)?  COVID-19   COVID-19 underlying condition?  None   TCM attempt successful?  Yes   Call start time  1625   Call end time  1637   Person spoke with today (if not patient) and relationship  Larry Young, spouse emergency contact   Meds reviewed with patient/caregiver?  Yes   Is the patient having any side effects they believe may be caused by any medication additions or changes?  No   Does the patient have all medications ordered at discharge?  Yes   Is the patient taking all medications as directed (includes completed medication regime)?  Yes   Does the patient have a primary care provider?   Yes   Comments regarding PCP  Hospital PCP FOLLOW UP APPOINTMENT IS 9/27/21@0800am   Does the patient have an appointment with their PCP or specialist within 7 days of discharge?  Greater than 7 days   What is preventing the patient from scheduling follow up appointments within 7 days of discharge?  -- [Pt had a death in family and had to cancel her prior appt]   Has home health visited the patient within 72 hours of discharge?  N/A   What DME was ordered?  [place] for home O2   Has all DME been delivered?  Yes   DME comments  Oxygen/nebs---oxygen at 2 lpm per prn   Did the patient receive a copy of their discharge instructions?  Yes   Did the patient receive a copy of COVID-19 specific instructions?  Yes   Nursing interventions  Reviewed instructions with patient   What is the patient's perception of their health status since discharge?  Same [Doing alot better per spouse.  She is using oxygen prn, sleeping on her sides, using her nebs.  Her father passed away this morning so she is dealing with this struggle right now..condelences offered.]   Does the patient have any of the following symptoms?  Cough, Shortness of breath   Nursing Interventions   Nurse provided patient education [Dis'd s/s of worsening condition which require MD notification such as increased SOA, change in amount/color of mucus, chest pain, fever, increased cough-v/u.]   Pulse Ox monitoring  Intermittent   Pulse Ox device source  Patient   O2 Sat comments  Pt sats 96-97% on room air--spouse understands to monitor to keep sats above 88-90% and to see UC if sats do not increase after rest/oxygen   O2 Sat: education provided  Sat levels, Monitoring frequency, When to seek care   Is the patient/caregiver able to teach back steps to recovery at home?  Set small, achievable goals for return to baseline health, Rest and rebuild strength, gradually increase activity   Is the patient/caregiver able to teach back the hierarchy of who to call/visit for symptoms/problems? PCP, Specialist, Home health nurse, Urgent Care, ED, 911  Yes [Provided number to Covid call line and encouraged patient to answer phone for next few days for f/u calls]   TCM call completed?  Yes          Poly Prasad RN    9/20/2021, 16:37 EDT

## 2021-09-21 ENCOUNTER — READMISSION MANAGEMENT (OUTPATIENT)
Dept: CALL CENTER | Facility: HOSPITAL | Age: 26
End: 2021-09-21

## 2021-09-21 LAB
BACTERIA SPEC AEROBE CULT: NORMAL
BACTERIA SPEC AEROBE CULT: NORMAL

## 2021-09-21 NOTE — OUTREACH NOTE
COVID-19 Week 1 Survey      Responses   [facility] patient discharged from?  [facility]   Does the patient have one of the following disease processes/diagnoses(primary or secondary)?  COVID-19   COVID-19 underlying condition?  None   Call Number  Call 2 [PATIENT CONTACT NUMBER ANSWERED BY RECORDING, NO MESSAGE LEFT. EMERGENCY CONTACT RINGS BUSY.]   Discharge diagnosis  PNA due to Covid           Michelle Felton RN

## 2021-09-22 ENCOUNTER — READMISSION MANAGEMENT (OUTPATIENT)
Dept: CALL CENTER | Facility: HOSPITAL | Age: 26
End: 2021-09-22

## 2021-09-22 NOTE — OUTREACH NOTE
"COVID-19 Week 1 Survey      Responses   [facility] patient discharged from?  [facility]   Does the patient have one of the following disease processes/diagnoses(primary or secondary)?  COVID-19   COVID-19 underlying condition?  None   Call Number  Call 3   Week 1 Call successful?  Yes   Call start time  1154   Call end time  1155   Discharge diagnosis  PNA due to Covid    Person spoke with today (if not patient) and relationship  Larry [place] reviewed with patient/caregiver?  Yes   Is the patient taking all medications as directed (includes completed medication regime)?  Yes   Has the patient kept scheduled appointments due by today?  N/A   Psychosocial issues?  No   What is the patient's perception of their health status since discharge?  Improving   Does the patient have any of the following symptoms?  None   Pulse Ox monitoring  Intermittent   O2 Sat comments  Not checked at time of call. Pt was still sleeping.    Is the patient/caregiver able to teach back steps to recovery at home?  Rest and rebuild strength, gradually increase activity, Eat a well-balance diet   If the patient is a current smoker, are they able to teach back resources for cessation?  Not a smoker   COVID-19 call completed?  Yes   Revoked  No further contact(revokes)-requires comment   Is the patient interested in additional calls from an ambulatory ?  NOTE:  applies to high risk patients requiring additional follow-up.  No   Graduated/Revoked comments   reports, \"I think she's improving and will be alright\" when asked if another call would be helpful          Juanita Courtney RN  "

## 2022-04-15 ENCOUNTER — APPOINTMENT (OUTPATIENT)
Dept: ULTRASOUND IMAGING | Facility: HOSPITAL | Age: 27
End: 2022-04-15

## 2022-04-15 ENCOUNTER — HOSPITAL ENCOUNTER (EMERGENCY)
Facility: HOSPITAL | Age: 27
Discharge: HOME | End: 2022-04-15
Attending: EMERGENCY MEDICINE | Admitting: EMERGENCY MEDICINE

## 2022-04-15 VITALS
HEART RATE: 87 BPM | HEIGHT: 60 IN | TEMPERATURE: 98.2 F | BODY MASS INDEX: 29.48 KG/M2 | OXYGEN SATURATION: 99 % | DIASTOLIC BLOOD PRESSURE: 82 MMHG | WEIGHT: 150.13 LBS | SYSTOLIC BLOOD PRESSURE: 103 MMHG | RESPIRATION RATE: 16 BRPM

## 2022-04-15 DIAGNOSIS — Z3A.01 LESS THAN 8 WEEKS GESTATION OF PREGNANCY: Primary | ICD-10-CM

## 2022-04-15 DIAGNOSIS — R10.30 LOWER ABDOMINAL PAIN: ICD-10-CM

## 2022-04-15 LAB
ALBUMIN SERPL-MCNC: 5.1 G/DL (ref 3.5–5.2)
ALBUMIN/GLOB SERPL: 1.9 G/DL
ALP SERPL-CCNC: 88 U/L (ref 39–117)
ALT SERPL W P-5'-P-CCNC: 14 U/L (ref 1–33)
ANION GAP SERPL CALCULATED.3IONS-SCNC: 13.6 MMOL/L (ref 5–15)
AST SERPL-CCNC: 12 U/L (ref 1–32)
BACTERIA UR QL AUTO: ABNORMAL /HPF
BASOPHILS # BLD AUTO: 0.02 10*3/MM3 (ref 0–0.2)
BASOPHILS NFR BLD AUTO: 0.2 % (ref 0–1.5)
BILIRUB SERPL-MCNC: 0.2 MG/DL (ref 0–1.2)
BILIRUB UR QL STRIP: NEGATIVE
BUN SERPL-MCNC: 10 MG/DL (ref 6–20)
BUN/CREAT SERPL: 14.9 (ref 7–25)
CALCIUM SPEC-SCNC: 9.9 MG/DL (ref 8.6–10.5)
CHLORIDE SERPL-SCNC: 102 MMOL/L (ref 98–107)
CLARITY UR: CLEAR
CO2 SERPL-SCNC: 21.4 MMOL/L (ref 22–29)
COLOR UR: YELLOW
CREAT SERPL-MCNC: 0.67 MG/DL (ref 0.57–1)
DEPRECATED RDW RBC AUTO: 37.1 FL (ref 37–54)
EGFRCR SERPLBLD CKD-EPI 2021: 123 ML/MIN/1.73
EOSINOPHIL # BLD AUTO: 0.1 10*3/MM3 (ref 0–0.4)
EOSINOPHIL NFR BLD AUTO: 1 % (ref 0.3–6.2)
ERYTHROCYTE [DISTWIDTH] IN BLOOD BY AUTOMATED COUNT: 11.9 % (ref 12.3–15.4)
GLOBULIN UR ELPH-MCNC: 2.7 GM/DL
GLUCOSE SERPL-MCNC: 134 MG/DL (ref 65–99)
GLUCOSE UR STRIP-MCNC: NEGATIVE MG/DL
HCG INTACT+B SERPL-ACNC: 634.8 MIU/ML
HCT VFR BLD AUTO: 43.1 % (ref 34–46.6)
HGB BLD-MCNC: 14.2 G/DL (ref 12–15.9)
HGB UR QL STRIP.AUTO: ABNORMAL
HOLD SPECIMEN: NORMAL
HOLD SPECIMEN: NORMAL
HYALINE CASTS UR QL AUTO: ABNORMAL /LPF
IMM GRANULOCYTES # BLD AUTO: 0.03 10*3/MM3 (ref 0–0.05)
IMM GRANULOCYTES NFR BLD AUTO: 0.3 % (ref 0–0.5)
KETONES UR QL STRIP: NEGATIVE
LEUKOCYTE ESTERASE UR QL STRIP.AUTO: NEGATIVE
LIPASE SERPL-CCNC: 32 U/L (ref 13–60)
LYMPHOCYTES # BLD AUTO: 2.82 10*3/MM3 (ref 0.7–3.1)
LYMPHOCYTES NFR BLD AUTO: 27.4 % (ref 19.6–45.3)
MCH RBC QN AUTO: 28.1 PG (ref 26.6–33)
MCHC RBC AUTO-ENTMCNC: 32.9 G/DL (ref 31.5–35.7)
MCV RBC AUTO: 85.2 FL (ref 79–97)
MONOCYTES # BLD AUTO: 0.72 10*3/MM3 (ref 0.1–0.9)
MONOCYTES NFR BLD AUTO: 7 % (ref 5–12)
NEUTROPHILS NFR BLD AUTO: 6.62 10*3/MM3 (ref 1.7–7)
NEUTROPHILS NFR BLD AUTO: 64.1 % (ref 42.7–76)
NITRITE UR QL STRIP: NEGATIVE
NRBC BLD AUTO-RTO: 0 /100 WBC (ref 0–0.2)
PH UR STRIP.AUTO: 6 [PH] (ref 5–8)
PLATELET # BLD AUTO: 413 10*3/MM3 (ref 140–450)
PMV BLD AUTO: 8.6 FL (ref 6–12)
POTASSIUM SERPL-SCNC: 3.8 MMOL/L (ref 3.5–5.2)
PROT SERPL-MCNC: 7.8 G/DL (ref 6–8.5)
PROT UR QL STRIP: NEGATIVE
RBC # BLD AUTO: 5.06 10*6/MM3 (ref 3.77–5.28)
RBC # UR STRIP: ABNORMAL /HPF
REF LAB TEST METHOD: ABNORMAL
SODIUM SERPL-SCNC: 137 MMOL/L (ref 136–145)
SP GR UR STRIP: 1.02 (ref 1–1.03)
SQUAMOUS #/AREA URNS HPF: ABNORMAL /HPF
UROBILINOGEN UR QL STRIP: ABNORMAL
WBC # UR STRIP: ABNORMAL /HPF
WBC NRBC COR # BLD: 10.31 10*3/MM3 (ref 3.4–10.8)
WHOLE BLOOD HOLD SPECIMEN: NORMAL
WHOLE BLOOD HOLD SPECIMEN: NORMAL

## 2022-04-15 PROCEDURE — 83690 ASSAY OF LIPASE: CPT

## 2022-04-15 PROCEDURE — 76817 TRANSVAGINAL US OBSTETRIC: CPT

## 2022-04-15 PROCEDURE — 81001 URINALYSIS AUTO W/SCOPE: CPT | Performed by: EMERGENCY MEDICINE

## 2022-04-15 PROCEDURE — 36415 COLL VENOUS BLD VENIPUNCTURE: CPT

## 2022-04-15 PROCEDURE — 80053 COMPREHEN METABOLIC PANEL: CPT | Performed by: NURSE PRACTITIONER

## 2022-04-15 PROCEDURE — 84702 CHORIONIC GONADOTROPIN TEST: CPT

## 2022-04-15 PROCEDURE — 99283 EMERGENCY DEPT VISIT LOW MDM: CPT

## 2022-04-15 PROCEDURE — 85025 COMPLETE CBC W/AUTO DIFF WBC: CPT | Performed by: EMERGENCY MEDICINE

## 2022-04-15 RX ORDER — SODIUM CHLORIDE 0.9 % (FLUSH) 0.9 %
10 SYRINGE (ML) INJECTION AS NEEDED
Status: DISCONTINUED | OUTPATIENT
Start: 2022-04-15 | End: 2022-04-16 | Stop reason: HOSPADM

## 2022-04-16 NOTE — ED PROVIDER NOTES
Time: 22:02 EDT  Arrived by: Vehicle  Chief Complaint: Low back pain  History provided by: Patient  History is limited by: N/A    History of Present Illness:  Patient is a 26 y.o. year old female that presents to the emergency department with LOW AB PAIN ALL WEEK. + HOME PREG TEST      History provided by:  Patient  Abdominal Pain  Pain location:  LLQ, RLQ and suprapubic  Pain quality: cramping and dull    Pain radiates to:  Does not radiate  Pain severity:  Mild  Onset quality:  Gradual  Duration:  7 days  Timing:  Intermittent  Progression:  Waxing and waning  Chronicity:  New  Context comment:  No known cause or source.  Just low abdominal crampy achiness on and off all week mostly on left.  Patient has had a history of ovarian cyst in the past but cannot remember if it felt similar  Relieved by:  Nothing (Randomly comes and goes)  Worsened by:  Nothing  Ineffective treatments:  None tried  Associated symptoms: no anorexia, no belching, no chest pain, no chills, no constipation, no cough, no diarrhea, no dysuria, no fatigue, no fever, no flatus, no hematemesis, no hematochezia, no hematuria, no melena, no nausea, no shortness of breath, no sore throat, no vaginal bleeding, no vaginal discharge and no vomiting    Risk factors: pregnancy ( Positive home test)    Pregnancy Problem    Primary symptoms include abdominal pain. Patient reports no vaginal bleeding and no vaginal discharge.   Associated symptoms include no dysuria, no fever, no nausea, no shortness of breath and no vomiting.       Similar Symptoms Previously: No    Recently seen: No      Patient Care Team  Primary Care Provider: NONE      Past Medical History:     No Known Allergies  Past Medical History:   Diagnosis Date   • Ovarian cyst      History reviewed. No pertinent surgical history.  History reviewed. No pertinent family history.    Home Medications:  Prior to Admission medications    Medication Sig Start Date End Date Taking? Authorizing  "Provider   albuterol (ACCUNEB) 1.25 MG/3ML nebulizer solution Take 3 mL by nebulization Every 6 (Six) Hours As Needed for Wheezing or Shortness of Air. 9/17/21   Jos Cedillo MD   budesonide-formoterol (Symbicort) 160-4.5 MCG/ACT inhaler Inhale 2 puffs 2 (Two) Times a Day. 9/17/21   Jos Cedillo MD   dexamethasone (DECADRON) 6 MG tablet Take 1 tablet by mouth Daily With Breakfast. 9/17/21   Jos Cedillo MD   guaiFENesin-codeine (guaiFENesin AC) 100-10 MG/5ML liquid Take 5 mL by mouth 4 (Four) Times a Day As Needed for Cough. 9/17/21   Jos Cedillo MD        Social History:   PT  reports that she has never smoked. She does not have any smokeless tobacco history on file. She reports that she does not drink alcohol and does not use drugs.    Record Review:  I have reviewed the patient's records in [employer].     Review of Systems  Review of Systems   Constitutional: Negative for chills, fatigue and fever.   HENT: Negative for sore throat.    Respiratory: Negative for cough and shortness of breath.    Cardiovascular: Negative for chest pain.   Gastrointestinal: Positive for abdominal pain. Negative for anorexia, constipation, diarrhea, flatus, hematemesis, hematochezia, melena, nausea and vomiting.   Genitourinary: Positive for menstrual problem ( Patient has chronically irregular periods so did not think anything of missing 1.  Took a home test and it was positive.). Negative for difficulty urinating, dysuria, flank pain, hematuria, vaginal bleeding and vaginal discharge.   Musculoskeletal: Negative for back pain.   Skin: Negative.    Neurological: Negative.    Hematological: Negative.    Psychiatric/Behavioral: Negative.         Physical Exam  /72   Pulse 88   Temp 98.2 °F (36.8 °C) (Oral)   Resp 16   Ht 152.4 cm (60\")   Wt 68.1 kg (150 lb 2.1 oz)   LMP 02/14/2022   SpO2 96%   BMI 29.32 kg/m²     Physical Exam  Vitals and nursing note reviewed.   Constitutional:       General: She " "is not in acute distress.     Appearance: Normal appearance. She is not toxic-appearing.   HENT:      Head: Normocephalic and atraumatic.      Mouth/Throat:      Mouth: Mucous membranes are moist.   Eyes:      General: No scleral icterus.  Cardiovascular:      Rate and Rhythm: Normal rate and regular rhythm.      Pulses: Normal pulses.      Heart sounds: Normal heart sounds.   Pulmonary:      Effort: Pulmonary effort is normal. No respiratory distress.      Breath sounds: Normal breath sounds.   Abdominal:      General: Abdomen is flat. Bowel sounds are normal.      Palpations: Abdomen is soft.      Tenderness: There is abdominal tenderness ( Mild) in the suprapubic area and left lower quadrant. There is no right CVA tenderness, left CVA tenderness, guarding or rebound. Negative signs include Grajeda's sign, Rovsing's sign, McBurney's sign, psoas sign and obturator sign.      Hernia: No hernia is present.   Musculoskeletal:         General: Normal range of motion.      Cervical back: Normal range of motion and neck supple.   Skin:     General: Skin is warm and dry.   Neurological:      Mental Status: She is alert and oriented to person, place, and time. Mental status is at baseline.   Psychiatric:         Mood and Affect: Mood normal.         Behavior: Behavior normal.          ED Course  /72   Pulse 88   Temp 98.2 °F (36.8 °C) (Oral)   Resp 16   Ht 152.4 cm (60\")   Wt 68.1 kg (150 lb 2.1 oz)   LMP 02/14/2022   SpO2 96%   BMI 29.32 kg/m²   Results for orders placed or performed during the hospital encounter of 04/15/22   Lipase    Specimen: Arm, Left; Blood   Result Value Ref Range    Lipase 32 13 - 60 U/L   hCG, Quantitative, Pregnancy    Specimen: Arm, Left; Blood   Result Value Ref Range    HCG Quantitative 634.80 mIU/mL   Urinalysis With Microscopic If Indicated (No Culture) - Urine, Clean Catch    Specimen: Urine, Clean Catch   Result Value Ref Range    Color, UA Yellow Yellow, Straw    Appearance, " UA Clear Clear    pH, UA 6.0 5.0 - 8.0    Specific Gravity, UA 1.017 1.005 - 1.030    Glucose, UA Negative Negative    Ketones, UA Negative Negative    Bilirubin, UA Negative Negative    Blood, UA Trace (A) Negative    Protein, UA Negative Negative    Leuk Esterase, UA Negative Negative    Nitrite, UA Negative Negative    Urobilinogen, UA 0.2 E.U./dL 0.2 - 1.0 E.U./dL   CBC Auto Differential    Specimen: Arm, Left; Blood   Result Value Ref Range    WBC 10.31 3.40 - 10.80 10*3/mm3    RBC 5.06 3.77 - 5.28 10*6/mm3    Hemoglobin 14.2 12.0 - 15.9 g/dL    Hematocrit 43.1 34.0 - 46.6 %    MCV 85.2 79.0 - 97.0 fL    MCH 28.1 26.6 - 33.0 pg    MCHC 32.9 31.5 - 35.7 g/dL    RDW 11.9 (L) 12.3 - 15.4 %    RDW-SD 37.1 37.0 - 54.0 fl    MPV 8.6 6.0 - 12.0 fL    Platelets 413 140 - 450 10*3/mm3    Neutrophil % 64.1 42.7 - 76.0 %    Lymphocyte % 27.4 19.6 - 45.3 %    Monocyte % 7.0 5.0 - 12.0 %    Eosinophil % 1.0 0.3 - 6.2 %    Basophil % 0.2 0.0 - 1.5 %    Immature Grans % 0.3 0.0 - 0.5 %    Neutrophils, Absolute 6.62 1.70 - 7.00 10*3/mm3    Lymphocytes, Absolute 2.82 0.70 - 3.10 10*3/mm3    Monocytes, Absolute 0.72 0.10 - 0.90 10*3/mm3    Eosinophils, Absolute 0.10 0.00 - 0.40 10*3/mm3    Basophils, Absolute 0.02 0.00 - 0.20 10*3/mm3    Immature Grans, Absolute 0.03 0.00 - 0.05 10*3/mm3    nRBC 0.0 0.0 - 0.2 /100 WBC   Urinalysis, Microscopic Only - Urine, Clean Catch    Specimen: Urine, Clean Catch   Result Value Ref Range    RBC, UA 3-5 (A) None Seen /HPF    WBC, UA 0-2 (A) None Seen /HPF    Bacteria, UA None Seen None Seen /HPF    Squamous Epithelial Cells, UA 0-2 None Seen, 0-2 /HPF    Hyaline Casts, UA 0-2 None Seen /LPF    Methodology Automated Microscopy    Comprehensive Metabolic Panel    Specimen: Arm, Left; Blood   Result Value Ref Range    Glucose 134 (H) 65 - 99 mg/dL    BUN 10 6 - 20 mg/dL    Creatinine 0.67 0.57 - 1.00 mg/dL    Sodium 137 136 - 145 mmol/L    Potassium 3.8 3.5 - 5.2 mmol/L    Chloride 102 98 - 107  mmol/L    CO2 21.4 (L) 22.0 - 29.0 mmol/L    Calcium 9.9 8.6 - 10.5 mg/dL    Total Protein 7.8 6.0 - 8.5 g/dL    Albumin 5.10 3.50 - 5.20 g/dL    ALT (SGPT) 14 1 - 33 U/L    AST (SGOT) 12 1 - 32 U/L    Alkaline Phosphatase 88 39 - 117 U/L    Total Bilirubin 0.2 0.0 - 1.2 mg/dL    Globulin 2.7 gm/dL    A/G Ratio 1.9 g/dL    BUN/Creatinine Ratio 14.9 7.0 - 25.0    Anion Gap 13.6 5.0 - 15.0 mmol/L    eGFR 123.0 >60.0 mL/min/1.73   Green Top (Gel)   Result Value Ref Range    Extra Tube Hold for add-ons.    Lavender Top   Result Value Ref Range    Extra Tube hold for add-on    Gold Top - SST   Result Value Ref Range    Extra Tube Hold for add-ons.    Light Blue Top   Result Value Ref Range    Extra Tube hold for add-on      Medications   sodium chloride 0.9 % flush 10 mL (has no administration in time range)     US Ob Transvaginal    Result Date: 4/15/2022  Narrative: PROCEDURE: US OB TRANSVAGINAL  COMPARISON: None.  INDICATIONS: +PREG & UNSPECIFIED PELVIC PAIN.  THE QUANTITATIVE BETA-HCG .8 PEPE-INTERNATIONAL UNITS/ML.  LOWER ABDOMINAL PAIN.  TECHNIQUE: Ultrasound examination of the pelvis was performed, using endovaginal/transvaginal technique.  A limited obstetric survey was performed.  FINDINGS:  Two-dimensional grayscale images as well as color and spectral Doppler analysis are provided for review.  Again, transvaginal/endovaginal technique was utilized. No intrauterine gestation is identified. No intrauterine gestational sac is seen. No embryonic pole or embryonic cardiac activity is identified. An ectopic gestation is not seen but is not entirely excluded by this study. The endocervical canal is nondilated and measures at least 2 cm in length.  Minimal, if any, free fluid is identified within the cul-de-sac.  There may be minimal free pelvic fluid surrounding the right ovary.  This finding is nonspecific.  The endometrial thickness is at least 2.4 cm.  This finding suggests gestational reaction.  Normal  follicles involve the bilateral ovaries. No suspicious uterine or adnexal mass is seen, otherwise. No ovarian torsion is identified.  That is, there is normal blood flow involving the bilateral ovaries by duplex ultrasound.  The uterus measures 6.5 x 4.2 x 3.5 cm. The right ovary measures 3.8 x 2.3 x 2.1 cm.  The left ovary measures 2.3 x 1.3 x 2.8 cm.      Impression:   1. No intrauterine gestational sac is seen. No embryonic pole or embryonic cardiac activity is identified. 2. An ectopic gestation is not seen but is not entirely excluded by this study. 3. Consider close interval clinical, lab, and imaging follow-up to ensure a viable intrauterine gestation. 4. No suspicious uterine or adnexal masses are seen. 5. No ovarian torsion is identified, bilaterally. 6. Minimal nonspecific free pelvic fluid is seen. 7. The other findings are as detailed above.    COMMENT:  Part of this note is an electronic transcription of spoken language to printed text. The electronic translation/transcription may permit erroneous, or at times, nonsensical (or even sensical) words or phrases to be inadvertently transcribed or omitted; this  has reviewed the note for such errors (as well as additional errors); however, some may still exist.  CHRISTEN SHEN JR, MD       Electronically Signed and Approved By: CHRISTEN SHEN JR, MD on 4/15/2022 at 21:40                Medical Decision Making:                     MDM  Number of Diagnoses or Management Options  Less than 8 weeks gestation of pregnancy  Lower abdominal pain  Diagnosis management comments: The patient is resting comfortably and feels better, is alert and in no distress. Repeat examination is unremarkable and benign; in particular, there's no discomfort at McBurney's point and there is no pulsatile mass. The history, exam, diagnostic testing, and current condition does not suggest acute appendicitis, bowel obstruction, acute cholecystitis, bowel perforation, major  gastrointestinal bleeding, severe diverticulitis, abdominal aortic aneurysm, mesenteric ischemia, volvulus, sepsis, or other significant pathology that warrants further testing, continued ED treatment, admission, for surgical evaluation at this point. The vital signs have been stable. The patient does not have uncontrollable pain, intractable vomiting, or other significant symptoms. The patient's condition is stable and appropriate for discharge from the emergency department.         Amount and/or Complexity of Data Reviewed  Clinical lab tests: reviewed and ordered  Tests in the radiology section of CPT®: reviewed and ordered    Risk of Complications, Morbidity, and/or Mortality  Presenting problems: low  Diagnostic procedures: low  Management options: low    Patient Progress  Patient progress: stable       Final diagnoses:   Less than 8 weeks gestation of pregnancy   Lower abdominal pain        Disposition:  ED Disposition     ED Disposition   Discharge    Condition   Stable    Comment   --              Rosa Beaulieu, APRN  04/15/22 7030

## 2022-04-16 NOTE — DISCHARGE INSTRUCTIONS
Today all testing was negative for any acute abnormality.    Ultrasound did not show anything and did not detect an ectopic pregnancy yet or an intrauterine pregnancy.  This is likely due to your levels being so low and additional follow-up is needed.  Get outpatient hCG testing Sunday or Monday and call Monday morning for OB/GYN follow-up as referred above.    Tylenol for pain.    Return for increased pain or localization to the right lower quadrant, development of fever, or any new or worsening symptoms

## 2022-05-26 ENCOUNTER — INITIAL PRENATAL (OUTPATIENT)
Dept: OBSTETRICS AND GYNECOLOGY | Facility: CLINIC | Age: 27
End: 2022-05-26

## 2022-05-26 VITALS — WEIGHT: 154 LBS | BODY MASS INDEX: 30.08 KG/M2 | SYSTOLIC BLOOD PRESSURE: 119 MMHG | DIASTOLIC BLOOD PRESSURE: 79 MMHG

## 2022-05-26 DIAGNOSIS — Z3A.00 WEEKS OF GESTATION OF PREGNANCY NOT SPECIFIED: ICD-10-CM

## 2022-05-26 DIAGNOSIS — O26.849 UTERINE SIZE DATE DISCREPANCY PREGNANCY: ICD-10-CM

## 2022-05-26 DIAGNOSIS — Z34.00 SUPERVISION OF NORMAL FIRST PREGNANCY, ANTEPARTUM: Primary | ICD-10-CM

## 2022-05-26 PROBLEM — U07.1 PNEUMONIA DUE TO COVID-19 VIRUS: Status: RESOLVED | Noted: 2021-09-16 | Resolved: 2022-05-26

## 2022-05-26 PROBLEM — J12.82 PNEUMONIA DUE TO COVID-19 VIRUS: Status: RESOLVED | Noted: 2021-09-16 | Resolved: 2022-05-26

## 2022-05-26 LAB
ABO GROUP BLD: NORMAL
BACTERIA UR QL AUTO: NORMAL /HPF
BASOPHILS # BLD AUTO: 0.05 10*3/MM3 (ref 0–0.2)
BASOPHILS NFR BLD AUTO: 0.4 % (ref 0–1.5)
BILIRUB UR QL STRIP: NEGATIVE
BLD GP AB SCN SERPL QL: NEGATIVE
CLARITY UR: CLEAR
COLOR UR: YELLOW
DEPRECATED RDW RBC AUTO: 39.6 FL (ref 37–54)
EOSINOPHIL # BLD AUTO: 0.14 10*3/MM3 (ref 0–0.4)
EOSINOPHIL NFR BLD AUTO: 1.1 % (ref 0.3–6.2)
ERYTHROCYTE [DISTWIDTH] IN BLOOD BY AUTOMATED COUNT: 12.5 % (ref 12.3–15.4)
GLUCOSE UR STRIP-MCNC: NEGATIVE MG/DL
HCT VFR BLD AUTO: 40.5 % (ref 34–46.6)
HGB BLD-MCNC: 13.3 G/DL (ref 12–15.9)
HGB UR QL STRIP.AUTO: ABNORMAL
HIV1+2 AB SER QL: NORMAL
HYALINE CASTS UR QL AUTO: NORMAL /LPF
IMM GRANULOCYTES # BLD AUTO: 0.04 10*3/MM3 (ref 0–0.05)
IMM GRANULOCYTES NFR BLD AUTO: 0.3 % (ref 0–0.5)
KETONES UR QL STRIP: NEGATIVE
LEUKOCYTE ESTERASE UR QL STRIP.AUTO: NEGATIVE
LYMPHOCYTES # BLD AUTO: 2.29 10*3/MM3 (ref 0.7–3.1)
LYMPHOCYTES NFR BLD AUTO: 17.4 % (ref 19.6–45.3)
MCH RBC QN AUTO: 29 PG (ref 26.6–33)
MCHC RBC AUTO-ENTMCNC: 32.8 G/DL (ref 31.5–35.7)
MCV RBC AUTO: 88.4 FL (ref 79–97)
MONOCYTES # BLD AUTO: 0.79 10*3/MM3 (ref 0.1–0.9)
MONOCYTES NFR BLD AUTO: 6 % (ref 5–12)
NEUTROPHILS NFR BLD AUTO: 74.8 % (ref 42.7–76)
NEUTROPHILS NFR BLD AUTO: 9.82 10*3/MM3 (ref 1.7–7)
NITRITE UR QL STRIP: NEGATIVE
NRBC BLD AUTO-RTO: 0 /100 WBC (ref 0–0.2)
PH UR STRIP.AUTO: 6 [PH] (ref 5–8)
PLATELET # BLD AUTO: 394 10*3/MM3 (ref 140–450)
PMV BLD AUTO: 9 FL (ref 6–12)
PROT UR QL STRIP: NEGATIVE
RBC # BLD AUTO: 4.58 10*6/MM3 (ref 3.77–5.28)
RBC # UR STRIP: NORMAL /HPF
REF LAB TEST METHOD: NORMAL
RH BLD: POSITIVE
RPR SER QL: NORMAL
SP GR UR STRIP: 1.02 (ref 1–1.03)
SQUAMOUS #/AREA URNS HPF: NORMAL /HPF
UROBILINOGEN UR QL STRIP: ABNORMAL
WBC # UR STRIP: NORMAL /HPF
WBC NRBC COR # BLD: 13.13 10*3/MM3 (ref 3.4–10.8)

## 2022-05-26 PROCEDURE — 0501F PRENATAL FLOW SHEET: CPT | Performed by: NURSE PRACTITIONER

## 2022-05-26 PROCEDURE — G0123 SCREEN CERV/VAG THIN LAYER: HCPCS | Performed by: NURSE PRACTITIONER

## 2022-05-26 PROCEDURE — 87491 CHLMYD TRACH DNA AMP PROBE: CPT | Performed by: NURSE PRACTITIONER

## 2022-05-26 PROCEDURE — 81001 URINALYSIS AUTO W/SCOPE: CPT | Performed by: NURSE PRACTITIONER

## 2022-05-26 PROCEDURE — 87624 HPV HI-RISK TYP POOLED RSLT: CPT | Performed by: NURSE PRACTITIONER

## 2022-05-26 PROCEDURE — 87591 N.GONORRHOEAE DNA AMP PROB: CPT | Performed by: NURSE PRACTITIONER

## 2022-05-26 PROCEDURE — G0432 EIA HIV-1/HIV-2 SCREEN: HCPCS | Performed by: NURSE PRACTITIONER

## 2022-05-26 PROCEDURE — 81220 CFTR GENE COM VARIANTS: CPT | Performed by: NURSE PRACTITIONER

## 2022-05-26 PROCEDURE — 83020 HEMOGLOBIN ELECTROPHORESIS: CPT | Performed by: NURSE PRACTITIONER

## 2022-05-26 PROCEDURE — 87661 TRICHOMONAS VAGINALIS AMPLIF: CPT | Performed by: NURSE PRACTITIONER

## 2022-05-26 PROCEDURE — 86803 HEPATITIS C AB TEST: CPT | Performed by: NURSE PRACTITIONER

## 2022-05-26 PROCEDURE — 36415 COLL VENOUS BLD VENIPUNCTURE: CPT | Performed by: NURSE PRACTITIONER

## 2022-05-26 PROCEDURE — 87086 URINE CULTURE/COLONY COUNT: CPT | Performed by: NURSE PRACTITIONER

## 2022-05-26 RX ORDER — PRENATAL WITH FERROUS FUM AND FOLIC ACID 3080; 920; 120; 400; 22; 1.84; 3; 20; 10; 1; 12; 200; 27; 25; 2 [IU]/1; [IU]/1; MG/1; [IU]/1; MG/1; MG/1; MG/1; MG/1; MG/1; MG/1; UG/1; MG/1; MG/1; MG/1; MG/1
1 TABLET ORAL DAILY
Qty: 90 TABLET | Refills: 3 | Status: ON HOLD | OUTPATIENT
Start: 2022-05-26 | End: 2022-07-31

## 2022-05-26 NOTE — PROGRESS NOTES
OB Initial Visit    CC- Here for care of current pregnancy, first visit    Subjective:  26 y.o.  presenting for her first obstetrical visit.    LMP: Patient's last menstrual period was 2022.     COMPLAINS OF: Nausea    States her cycle is very irregular.  Was seen in the ED on 4/15/22, quant level was 634, no IUP seen on ultrasound.  Reports pain/bleeding has since resolved.     Reviewed and updated:  OBHx, GYNHx (STDs), PMHx, Medications, Allergies, PSHx, Social Hx, Preventative Hx (PAP), Hx of abuse/safe environment, Vaccine Hx including hx of chickenpox or vaccine, Genetic Hx (pt, FOB, both families).        Objective:  /79   Wt 69.9 kg (154 lb)   LMP 2022   BMI 30.08 kg/m²   General- NAD, alert and oriented, appropriate  Psych- Normal mood, good memory  Neck- No masses, no thyroid enlargement  CV- Regular rhythm, no murnurs  Resp- CTA to bases, no wheezes  Abdomen- Soft, non distended, non tender, no masses    Breast left- deferred  Breast right- deferred    External genitalia- Normal, no lesions  Urethra- Normal, no masses, non tender  Vagina- Normal, no discharge  Bladder- Normal, no masses, non tender  Cvx- Normal, no lesions, no discharge, no CMT  Uterus- Normal shape and consistency, non tender, Size less than dates.  Brief TAS shows viable IUP, +cardiac motion  Adnexa- Normal, no mass, non tender    Lymphatic- No palpable neck, axillary, or groin nodes  Ext- No edema, no cyanosis    Skin- No lesions, no rashes, no acanthosis nigricans    Assessment and Plan:  14w3d  Diagnoses and all orders for this visit:    1. Supervision of normal first pregnancy, antepartum (Primary)  Overview:  WENDI finalized: Dating scan ordered, irregular cycles    Genetic testing:  Discussed NIPS, CF, AFP  CF drawn    COVID: Recommended 22  Tdap:    Rhogam:  ?Sterilization:    Anatomy US:  FU US:    PROBLEM LIST/PLAN:   Size less than dates - dating scan ordered         Orders:  -     OB Panel With  HIV  -     Urinalysis With Microscopic - Urine, Clean Catch  -     Urine Culture - Urine, Urine, Random Void  -     IGP,CtNgTv,Apt HPV,rfx 16 / 18,45  -     Hemoglobinopathy Fractionation Cascade  -     Cystic Fibrosis Diagnostic Study  -     US Ob < 14 Weeks Single or First Gestation; Future    2. Weeks of gestation of pregnancy not specified    3. Uterine size date discrepancy pregnancy  Overview:  Dating scan ordered - quant 634 on 4/15/22    Orders:  -     US Ob < 14 Weeks Single or First Gestation; Future      Genetic Screening:   • CF    Vaccines:   • Recommend COVID vaccine, R/B discussed    Counseling:   • Nutrition discussed, calories, activity/exercise in pregnancy  • Discussed dietary restrictions/safety food preparation in pregnancy  • Reviewed what to expect prenatal visits, office providers  • Appropriate trimester precautions provided, N/V, vag bleeding, cramping  • Questions answered    Labs:   • Prenatal labs, cultures, and PAP performed (prn), CF, dating scan    Return in about 4 weeks (around 6/23/2022) for [facility], OB follow up.      Amando Lomax, APRN  05/26/2022    [facility] [address]  [facility]  [address] [address]  Dept: [phone]  Loc: [phone]

## 2022-05-27 LAB
BACTERIA SPEC AEROBE CULT: NORMAL
HBV SURFACE AG SERPL QL IA: NORMAL
HCV AB SER DONR QL: NORMAL
HGB A MFR BLD ELPH: 97.5 % (ref 96.4–98.8)
HGB A2 MFR BLD ELPH: 2.5 % (ref 1.8–3.2)
HGB F MFR BLD ELPH: 0 % (ref 0–2)
HGB FRACT BLD-IMP: NORMAL
HGB S MFR BLD ELPH: 0 %
RUBV IGG SERPL IA-ACNC: 21.3 INDEX

## 2022-06-01 LAB
C TRACH RRNA CVX QL NAA+PROBE: NEGATIVE
CYTOLOGIST CVX/VAG CYTO: NORMAL
CYTOLOGY CVX/VAG DOC CYTO: NORMAL
CYTOLOGY CVX/VAG DOC THIN PREP: NORMAL
DX ICD CODE: NORMAL
HIV 1 & 2 AB SER-IMP: NORMAL
HPV I/H RISK 4 DNA CVX QL PROBE+SIG AMP: NEGATIVE
N GONORRHOEA RRNA CVX QL NAA+PROBE: NEGATIVE
OTHER STN SPEC: NORMAL
STAT OF ADQ CVX/VAG CYTO-IMP: NORMAL
T VAGINALIS RRNA SPEC QL NAA+PROBE: NEGATIVE

## 2022-06-02 ENCOUNTER — HOSPITAL ENCOUNTER (OUTPATIENT)
Dept: ULTRASOUND IMAGING | Facility: HOSPITAL | Age: 27
Discharge: HOME | End: 2022-06-02
Admitting: NURSE PRACTITIONER

## 2022-06-02 DIAGNOSIS — O26.849 UTERINE SIZE DATE DISCREPANCY PREGNANCY: ICD-10-CM

## 2022-06-02 DIAGNOSIS — Z34.00 SUPERVISION OF NORMAL FIRST PREGNANCY, ANTEPARTUM: ICD-10-CM

## 2022-06-02 LAB
CFTR MUT ANL BLD/T: NORMAL
LABORATORY COMMENT REPORT: NORMAL

## 2022-06-02 PROCEDURE — 76801 OB US < 14 WKS SINGLE FETUS: CPT

## 2022-07-08 ENCOUNTER — TRANSCRIBE ORDERS (OUTPATIENT)
Dept: ADMINISTRATIVE | Facility: HOSPITAL | Age: 27
End: 2022-07-08

## 2022-07-08 DIAGNOSIS — Z36.9 ANTENATAL SCREENING ENCOUNTER: Primary | ICD-10-CM

## 2022-07-29 ENCOUNTER — APPOINTMENT (OUTPATIENT)
Dept: ULTRASOUND IMAGING | Facility: HOSPITAL | Age: 27
End: 2022-07-29

## 2022-07-29 ENCOUNTER — HOSPITAL ENCOUNTER (OUTPATIENT)
Facility: HOSPITAL | Age: 27
Setting detail: OBSERVATION
Discharge: HOME | End: 2022-07-30
Attending: OBSTETRICS & GYNECOLOGY | Admitting: OBSTETRICS & GYNECOLOGY

## 2022-07-29 ENCOUNTER — HOSPITAL ENCOUNTER (EMERGENCY)
Facility: HOSPITAL | Age: 27
Discharge: ANOTHER HOSPITAL | End: 2022-07-29
Attending: EMERGENCY MEDICINE | Admitting: EMERGENCY MEDICINE

## 2022-07-29 VITALS
TEMPERATURE: 97.9 F | OXYGEN SATURATION: 93 % | DIASTOLIC BLOOD PRESSURE: 67 MMHG | HEIGHT: 65 IN | WEIGHT: 153.22 LBS | BODY MASS INDEX: 25.53 KG/M2 | HEART RATE: 73 BPM | SYSTOLIC BLOOD PRESSURE: 110 MMHG | RESPIRATION RATE: 17 BRPM

## 2022-07-29 DIAGNOSIS — O34.32 INCOMPETENT CERVIX DURING SECOND TRIMESTER, ANTEPARTUM: ICD-10-CM

## 2022-07-29 DIAGNOSIS — Z3A.19 19 WEEKS GESTATION OF PREGNANCY: Primary | ICD-10-CM

## 2022-07-29 LAB
A1 MICROGLOB PLACENTAL VAG QL: NEGATIVE
ABO GROUP BLD: NORMAL
BACTERIA UR QL AUTO: ABNORMAL /HPF
BASOPHILS # BLD AUTO: 0.04 10*3/MM3 (ref 0–0.2)
BASOPHILS NFR BLD AUTO: 0.2 % (ref 0–1.5)
BILIRUB UR QL STRIP: NEGATIVE
BLD GP AB SCN SERPL QL: NEGATIVE
CLARITY UR: CLEAR
COLOR UR: YELLOW
DEPRECATED RDW RBC AUTO: 37.1 FL (ref 37–54)
EOSINOPHIL # BLD AUTO: 0.11 10*3/MM3 (ref 0–0.4)
EOSINOPHIL NFR BLD AUTO: 0.6 % (ref 0.3–6.2)
ERYTHROCYTE [DISTWIDTH] IN BLOOD BY AUTOMATED COUNT: 12 % (ref 12.3–15.4)
GLUCOSE UR STRIP-MCNC: NEGATIVE MG/DL
HCG INTACT+B SERPL-ACNC: NORMAL MIU/ML
HCT VFR BLD AUTO: 36.8 % (ref 34–46.6)
HGB BLD-MCNC: 12.5 G/DL (ref 12–15.9)
HGB UR QL STRIP.AUTO: ABNORMAL
HOLD SPECIMEN: NORMAL
HOLD SPECIMEN: NORMAL
HYALINE CASTS UR QL AUTO: ABNORMAL /LPF
IMM GRANULOCYTES # BLD AUTO: 0.14 10*3/MM3 (ref 0–0.05)
IMM GRANULOCYTES NFR BLD AUTO: 0.7 % (ref 0–0.5)
KETONES UR QL STRIP: NEGATIVE
LEUKOCYTE ESTERASE UR QL STRIP.AUTO: ABNORMAL
LYMPHOCYTES # BLD AUTO: 2.33 10*3/MM3 (ref 0.7–3.1)
LYMPHOCYTES NFR BLD AUTO: 12.4 % (ref 19.6–45.3)
MCH RBC QN AUTO: 28.9 PG (ref 26.6–33)
MCHC RBC AUTO-ENTMCNC: 34 G/DL (ref 31.5–35.7)
MCV RBC AUTO: 85.2 FL (ref 79–97)
MONOCYTES # BLD AUTO: 1.03 10*3/MM3 (ref 0.1–0.9)
MONOCYTES NFR BLD AUTO: 5.5 % (ref 5–12)
NEUTROPHILS NFR BLD AUTO: 15.17 10*3/MM3 (ref 1.7–7)
NEUTROPHILS NFR BLD AUTO: 80.6 % (ref 42.7–76)
NITRITE UR QL STRIP: NEGATIVE
NRBC BLD AUTO-RTO: 0 /100 WBC (ref 0–0.2)
NUMBER OF DOSES: NORMAL
PH UR STRIP.AUTO: 6 [PH] (ref 5–8)
PLATELET # BLD AUTO: 349 10*3/MM3 (ref 140–450)
PMV BLD AUTO: 8.6 FL (ref 6–12)
PROT UR QL STRIP: NEGATIVE
RBC # BLD AUTO: 4.32 10*6/MM3 (ref 3.77–5.28)
RBC # UR STRIP: ABNORMAL /HPF
REF LAB TEST METHOD: ABNORMAL
RH BLD: POSITIVE
SP GR UR STRIP: 1.02 (ref 1–1.03)
SQUAMOUS #/AREA URNS HPF: ABNORMAL /HPF
UROBILINOGEN UR QL STRIP: ABNORMAL
WBC # UR STRIP: ABNORMAL /HPF
WBC NRBC COR # BLD: 18.82 10*3/MM3 (ref 3.4–10.8)
WHOLE BLOOD HOLD COAG: NORMAL
WHOLE BLOOD HOLD SPECIMEN: NORMAL

## 2022-07-29 PROCEDURE — 76815 OB US LIMITED FETUS(S): CPT

## 2022-07-29 PROCEDURE — 84702 CHORIONIC GONADOTROPIN TEST: CPT

## 2022-07-29 PROCEDURE — 84112 EVAL AMNIOTIC FLUID PROTEIN: CPT | Performed by: OBSTETRICS & GYNECOLOGY

## 2022-07-29 PROCEDURE — G0378 HOSPITAL OBSERVATION PER HR: HCPCS

## 2022-07-29 PROCEDURE — 86901 BLOOD TYPING SEROLOGIC RH(D): CPT | Performed by: NURSE PRACTITIONER

## 2022-07-29 PROCEDURE — 99284 EMERGENCY DEPT VISIT MOD MDM: CPT

## 2022-07-29 PROCEDURE — 86850 RBC ANTIBODY SCREEN: CPT | Performed by: NURSE PRACTITIONER

## 2022-07-29 PROCEDURE — 81001 URINALYSIS AUTO W/SCOPE: CPT | Performed by: NURSE PRACTITIONER

## 2022-07-29 PROCEDURE — 86900 BLOOD TYPING SEROLOGIC ABO: CPT | Performed by: NURSE PRACTITIONER

## 2022-07-29 PROCEDURE — 85025 COMPLETE CBC W/AUTO DIFF WBC: CPT

## 2022-07-29 PROCEDURE — 36415 COLL VENOUS BLD VENIPUNCTURE: CPT

## 2022-07-29 RX ORDER — SODIUM CHLORIDE 0.9 % (FLUSH) 0.9 %
10 SYRINGE (ML) INJECTION AS NEEDED
Status: DISCONTINUED | OUTPATIENT
Start: 2022-07-29 | End: 2022-07-29 | Stop reason: HOSPADM

## 2022-07-30 VITALS
SYSTOLIC BLOOD PRESSURE: 108 MMHG | WEIGHT: 153 LBS | RESPIRATION RATE: 16 BRPM | TEMPERATURE: 99.1 F | HEART RATE: 81 BPM | BODY MASS INDEX: 25.49 KG/M2 | OXYGEN SATURATION: 100 % | HEIGHT: 65 IN | DIASTOLIC BLOOD PRESSURE: 65 MMHG

## 2022-07-30 PROBLEM — O34.32 INCOMPETENT CERVIX DURING SECOND TRIMESTER, ANTEPARTUM: Status: ACTIVE | Noted: 2022-07-30

## 2022-07-30 LAB
ABO GROUP BLD: NORMAL
BLD GP AB SCN SERPL QL: NEGATIVE
DEPRECATED RDW RBC AUTO: 36.5 FL (ref 37–54)
ERYTHROCYTE [DISTWIDTH] IN BLOOD BY AUTOMATED COUNT: 11.9 % (ref 12.3–15.4)
HCT VFR BLD AUTO: 33.4 % (ref 34–46.6)
HGB BLD-MCNC: 11.4 G/DL (ref 12–15.9)
MCH RBC QN AUTO: 29.1 PG (ref 26.6–33)
MCHC RBC AUTO-ENTMCNC: 34.1 G/DL (ref 31.5–35.7)
MCV RBC AUTO: 85.2 FL (ref 79–97)
PLATELET # BLD AUTO: 302 10*3/MM3 (ref 140–450)
PMV BLD AUTO: 8.3 FL (ref 6–12)
RBC # BLD AUTO: 3.92 10*6/MM3 (ref 3.77–5.28)
RH BLD: POSITIVE
T&S EXPIRATION DATE: NORMAL
WBC NRBC COR # BLD: 15.79 10*3/MM3 (ref 3.4–10.8)

## 2022-07-30 PROCEDURE — 86901 BLOOD TYPING SEROLOGIC RH(D): CPT | Performed by: OBSTETRICS & GYNECOLOGY

## 2022-07-30 PROCEDURE — 85027 COMPLETE CBC AUTOMATED: CPT | Performed by: OBSTETRICS & GYNECOLOGY

## 2022-07-30 PROCEDURE — G0378 HOSPITAL OBSERVATION PER HR: HCPCS

## 2022-07-30 PROCEDURE — 86900 BLOOD TYPING SEROLOGIC ABO: CPT | Performed by: OBSTETRICS & GYNECOLOGY

## 2022-07-30 PROCEDURE — 86850 RBC ANTIBODY SCREEN: CPT | Performed by: OBSTETRICS & GYNECOLOGY

## 2022-07-30 RX ORDER — LIDOCAINE HYDROCHLORIDE 10 MG/ML
5 INJECTION, SOLUTION EPIDURAL; INFILTRATION; INTRACAUDAL; PERINEURAL AS NEEDED
Status: DISCONTINUED | OUTPATIENT
Start: 2022-07-30 | End: 2022-07-30 | Stop reason: HOSPADM

## 2022-07-30 RX ORDER — ONDANSETRON 2 MG/ML
4 INJECTION INTRAMUSCULAR; INTRAVENOUS EVERY 8 HOURS PRN
Status: DISCONTINUED | OUTPATIENT
Start: 2022-07-30 | End: 2022-07-30 | Stop reason: HOSPADM

## 2022-07-30 RX ORDER — SODIUM CHLORIDE, SODIUM LACTATE, POTASSIUM CHLORIDE, CALCIUM CHLORIDE 600; 310; 30; 20 MG/100ML; MG/100ML; MG/100ML; MG/100ML
100 INJECTION, SOLUTION INTRAVENOUS CONTINUOUS
Status: DISCONTINUED | OUTPATIENT
Start: 2022-07-30 | End: 2022-07-30 | Stop reason: HOSPADM

## 2022-07-30 RX ORDER — DOCUSATE SODIUM 100 MG/1
100 CAPSULE, LIQUID FILLED ORAL 2 TIMES DAILY
Status: DISCONTINUED | OUTPATIENT
Start: 2022-07-30 | End: 2022-07-30 | Stop reason: HOSPADM

## 2022-07-30 RX ORDER — TRISODIUM CITRATE DIHYDRATE AND CITRIC ACID MONOHYDRATE 500; 334 MG/5ML; MG/5ML
15 SOLUTION ORAL ONCE
Status: DISCONTINUED | OUTPATIENT
Start: 2022-07-30 | End: 2022-07-30 | Stop reason: HOSPADM

## 2022-07-30 RX ORDER — SODIUM CHLORIDE 0.9 % (FLUSH) 0.9 %
3 SYRINGE (ML) INJECTION EVERY 12 HOURS SCHEDULED
Status: DISCONTINUED | OUTPATIENT
Start: 2022-07-30 | End: 2022-07-30 | Stop reason: HOSPADM

## 2022-07-30 RX ORDER — SODIUM CHLORIDE 0.9 % (FLUSH) 0.9 %
10 SYRINGE (ML) INJECTION AS NEEDED
Status: DISCONTINUED | OUTPATIENT
Start: 2022-07-30 | End: 2022-07-30 | Stop reason: HOSPADM

## 2022-07-30 RX ORDER — ONDANSETRON 4 MG/1
4 TABLET, FILM COATED ORAL EVERY 8 HOURS PRN
Status: DISCONTINUED | OUTPATIENT
Start: 2022-07-30 | End: 2022-07-30 | Stop reason: HOSPADM

## 2022-07-30 RX ORDER — ACETAMINOPHEN 325 MG/1
650 TABLET ORAL EVERY 4 HOURS PRN
Status: DISCONTINUED | OUTPATIENT
Start: 2022-07-30 | End: 2022-07-30 | Stop reason: HOSPADM

## 2022-07-30 RX ORDER — HYDROXYZINE HYDROCHLORIDE 25 MG/1
50 TABLET, FILM COATED ORAL NIGHTLY PRN
Status: DISCONTINUED | OUTPATIENT
Start: 2022-07-30 | End: 2022-07-30 | Stop reason: HOSPADM

## 2022-07-30 RX ADMIN — SODIUM CHLORIDE, POTASSIUM CHLORIDE, SODIUM LACTATE AND CALCIUM CHLORIDE 100 ML/HR: 600; 310; 30; 20 INJECTION, SOLUTION INTRAVENOUS at 01:48

## 2022-07-30 RX ADMIN — HYDROXYZINE HYDROCHLORIDE 50 MG: 25 TABLET, FILM COATED ORAL at 01:43

## 2022-07-30 RX ADMIN — DOCUSATE SODIUM 100 MG: 100 CAPSULE, LIQUID FILLED ORAL at 14:21

## 2022-07-31 ENCOUNTER — HOSPITAL ENCOUNTER (INPATIENT)
Facility: HOSPITAL | Age: 27
LOS: 1 days | Discharge: HOME | End: 2022-07-31
Attending: OBSTETRICS & GYNECOLOGY | Admitting: OBSTETRICS & GYNECOLOGY

## 2022-07-31 VITALS
SYSTOLIC BLOOD PRESSURE: 120 MMHG | WEIGHT: 152 LBS | DIASTOLIC BLOOD PRESSURE: 73 MMHG | HEIGHT: 60 IN | TEMPERATURE: 98.5 F | HEART RATE: 79 BPM | RESPIRATION RATE: 19 BRPM | OXYGEN SATURATION: 98 % | BODY MASS INDEX: 29.84 KG/M2

## 2022-07-31 PROBLEM — Z34.90 PREGNANCY: Status: ACTIVE | Noted: 2022-07-31

## 2022-07-31 LAB
ABO GROUP BLD: NORMAL
BLD GP AB SCN SERPL QL: NEGATIVE
DEPRECATED RDW RBC AUTO: 37.6 FL (ref 37–54)
ERYTHROCYTE [DISTWIDTH] IN BLOOD BY AUTOMATED COUNT: 12.1 % (ref 12.3–15.4)
HCT VFR BLD AUTO: 36.6 % (ref 34–46.6)
HGB BLD-MCNC: 12.5 G/DL (ref 12–15.9)
MCH RBC QN AUTO: 29.3 PG (ref 26.6–33)
MCHC RBC AUTO-ENTMCNC: 34.2 G/DL (ref 31.5–35.7)
MCV RBC AUTO: 85.9 FL (ref 79–97)
PLATELET # BLD AUTO: 342 10*3/MM3 (ref 140–450)
PMV BLD AUTO: 8.6 FL (ref 6–12)
RBC # BLD AUTO: 4.26 10*6/MM3 (ref 3.77–5.28)
RH BLD: POSITIVE
T&S EXPIRATION DATE: NORMAL
WBC NRBC COR # BLD: 23.94 10*3/MM3 (ref 3.4–10.8)

## 2022-07-31 PROCEDURE — 87205 SMEAR GRAM STAIN: CPT | Performed by: OBSTETRICS & GYNECOLOGY

## 2022-07-31 PROCEDURE — 87176 TISSUE HOMOGENIZATION CULTR: CPT | Performed by: OBSTETRICS & GYNECOLOGY

## 2022-07-31 PROCEDURE — 86901 BLOOD TYPING SEROLOGIC RH(D): CPT | Performed by: OBSTETRICS & GYNECOLOGY

## 2022-07-31 PROCEDURE — G0378 HOSPITAL OBSERVATION PER HR: HCPCS

## 2022-07-31 PROCEDURE — 10907ZC DRAINAGE OF AMNIOTIC FLUID, THERAPEUTIC FROM PRODUCTS OF CONCEPTION, VIA NATURAL OR ARTIFICIAL OPENING: ICD-10-PCS | Performed by: OBSTETRICS & GYNECOLOGY

## 2022-07-31 PROCEDURE — 86900 BLOOD TYPING SEROLOGIC ABO: CPT | Performed by: OBSTETRICS & GYNECOLOGY

## 2022-07-31 PROCEDURE — 25010000002 MORPHINE SULFATE (PF) 5 MG/ML SOLUTION

## 2022-07-31 PROCEDURE — 87070 CULTURE OTHR SPECIMN AEROBIC: CPT | Performed by: OBSTETRICS & GYNECOLOGY

## 2022-07-31 PROCEDURE — 85027 COMPLETE CBC AUTOMATED: CPT | Performed by: OBSTETRICS & GYNECOLOGY

## 2022-07-31 PROCEDURE — 86850 RBC ANTIBODY SCREEN: CPT | Performed by: OBSTETRICS & GYNECOLOGY

## 2022-07-31 PROCEDURE — 88305 TISSUE EXAM BY PATHOLOGIST: CPT | Performed by: OBSTETRICS & GYNECOLOGY

## 2022-07-31 PROCEDURE — 87147 CULTURE TYPE IMMUNOLOGIC: CPT | Performed by: OBSTETRICS & GYNECOLOGY

## 2022-07-31 PROCEDURE — 25010000002 AMPICILLIN PER 500 MG: Performed by: OBSTETRICS & GYNECOLOGY

## 2022-07-31 RX ORDER — CARBOPROST TROMETHAMINE 250 UG/ML
250 INJECTION, SOLUTION INTRAMUSCULAR
Status: DISCONTINUED | OUTPATIENT
Start: 2022-07-31 | End: 2022-07-31

## 2022-07-31 RX ORDER — MORPHINE SULFATE 5 MG/ML
INJECTION, SOLUTION INTRAMUSCULAR; INTRAVENOUS
Status: COMPLETED
Start: 2022-07-31 | End: 2022-07-31

## 2022-07-31 RX ORDER — IBUPROFEN 600 MG/1
600 TABLET ORAL EVERY 6 HOURS PRN
Status: DISCONTINUED | OUTPATIENT
Start: 2022-07-31 | End: 2022-07-31

## 2022-07-31 RX ORDER — LIDOCAINE HYDROCHLORIDE 10 MG/ML
5 INJECTION, SOLUTION INFILTRATION; PERINEURAL AS NEEDED
Status: DISCONTINUED | OUTPATIENT
Start: 2022-07-31 | End: 2022-07-31

## 2022-07-31 RX ORDER — OXYTOCIN/0.9 % SODIUM CHLORIDE 30/500 ML
125 PLASTIC BAG, INJECTION (ML) INTRAVENOUS ONCE
Status: COMPLETED | OUTPATIENT
Start: 2022-07-31 | End: 2022-07-31

## 2022-07-31 RX ORDER — SODIUM CHLORIDE 0.9 % (FLUSH) 0.9 %
10 SYRINGE (ML) INJECTION EVERY 12 HOURS SCHEDULED
Status: DISCONTINUED | OUTPATIENT
Start: 2022-07-31 | End: 2022-07-31

## 2022-07-31 RX ORDER — LIDOCAINE HYDROCHLORIDE 10 MG/ML
5 INJECTION, SOLUTION EPIDURAL; INFILTRATION; INTRACAUDAL; PERINEURAL AS NEEDED
Status: DISCONTINUED | OUTPATIENT
Start: 2022-07-31 | End: 2022-07-31

## 2022-07-31 RX ORDER — OXYTOCIN/0.9 % SODIUM CHLORIDE 30/500 ML
125 PLASTIC BAG, INJECTION (ML) INTRAVENOUS ONCE
Status: DISCONTINUED | OUTPATIENT
Start: 2022-07-31 | End: 2022-07-31

## 2022-07-31 RX ORDER — ONDANSETRON 2 MG/ML
4 INJECTION INTRAMUSCULAR; INTRAVENOUS EVERY 6 HOURS PRN
Status: DISCONTINUED | OUTPATIENT
Start: 2022-07-31 | End: 2022-07-31

## 2022-07-31 RX ORDER — HYDROCODONE BITARTRATE AND ACETAMINOPHEN 5; 325 MG/1; MG/1
1 TABLET ORAL EVERY 4 HOURS PRN
Status: DISCONTINUED | OUTPATIENT
Start: 2022-07-31 | End: 2022-07-31

## 2022-07-31 RX ORDER — SODIUM CHLORIDE 0.9 % (FLUSH) 0.9 %
10 SYRINGE (ML) INJECTION AS NEEDED
Status: DISCONTINUED | OUTPATIENT
Start: 2022-07-31 | End: 2022-07-31

## 2022-07-31 RX ORDER — OXYTOCIN/0.9 % SODIUM CHLORIDE 30/500 ML
PLASTIC BAG, INJECTION (ML) INTRAVENOUS
Status: COMPLETED
Start: 2022-07-31 | End: 2022-07-31

## 2022-07-31 RX ORDER — SODIUM CHLORIDE, SODIUM LACTATE, POTASSIUM CHLORIDE, CALCIUM CHLORIDE 600; 310; 30; 20 MG/100ML; MG/100ML; MG/100ML; MG/100ML
125 INJECTION, SOLUTION INTRAVENOUS CONTINUOUS
Status: DISCONTINUED | OUTPATIENT
Start: 2022-07-31 | End: 2022-07-31

## 2022-07-31 RX ORDER — METHYLERGONOVINE MALEATE 0.2 MG/ML
200 INJECTION INTRAVENOUS ONCE AS NEEDED
Status: DISCONTINUED | OUTPATIENT
Start: 2022-07-31 | End: 2022-07-31

## 2022-07-31 RX ORDER — ONDANSETRON 4 MG/1
4 TABLET, FILM COATED ORAL EVERY 6 HOURS PRN
Status: DISCONTINUED | OUTPATIENT
Start: 2022-07-31 | End: 2022-07-31

## 2022-07-31 RX ORDER — MORPHINE SULFATE 5 MG/ML
4 INJECTION, SOLUTION INTRAMUSCULAR; INTRAVENOUS EVERY 4 HOURS PRN
Status: DISCONTINUED | OUTPATIENT
Start: 2022-07-31 | End: 2022-07-31

## 2022-07-31 RX ORDER — MISOPROSTOL 200 UG/1
800 TABLET ORAL ONCE AS NEEDED
Status: DISCONTINUED | OUTPATIENT
Start: 2022-07-31 | End: 2022-07-31

## 2022-07-31 RX ORDER — LIDOCAINE HYDROCHLORIDE 10 MG/ML
INJECTION, SOLUTION EPIDURAL; INFILTRATION; INTRACAUDAL; PERINEURAL
Status: DISCONTINUED
Start: 2022-07-31 | End: 2022-07-31 | Stop reason: WASHOUT

## 2022-07-31 RX ADMIN — Medication 125 ML/HR: at 07:03

## 2022-07-31 RX ADMIN — AMPICILLIN 2 G: 2 INJECTION, POWDER, FOR SOLUTION INTRAMUSCULAR; INTRAVENOUS at 06:05

## 2022-07-31 RX ADMIN — MORPHINE SULFATE 4 MG: 5 INJECTION, SOLUTION INTRAMUSCULAR; INTRAVENOUS at 06:08

## 2022-07-31 RX ADMIN — SODIUM CHLORIDE, POTASSIUM CHLORIDE, SODIUM LACTATE AND CALCIUM CHLORIDE 125 ML/HR: 600; 310; 30; 20 INJECTION, SOLUTION INTRAVENOUS at 06:04

## 2022-08-02 LAB
BACTERIA SPEC AEROBE CULT: ABNORMAL
GRAM STN SPEC: ABNORMAL
GRAM STN SPEC: ABNORMAL

## 2022-08-03 LAB
CYTO UR: NORMAL
LAB AP CASE REPORT: NORMAL
LAB AP CLINICAL INFORMATION: NORMAL
PATH REPORT.FINAL DX SPEC: NORMAL
PATH REPORT.GROSS SPEC: NORMAL

## 2022-08-05 ENCOUNTER — HOSPITAL ENCOUNTER (OUTPATIENT)
Dept: ULTRASOUND IMAGING | Facility: HOSPITAL | Age: 27
Discharge: HOME | End: 2022-08-05

## 2022-08-05 ENCOUNTER — TELEPHONE (OUTPATIENT)
Dept: LACTATION | Facility: HOSPITAL | Age: 27
End: 2022-08-05

## 2022-08-05 NOTE — TELEPHONE ENCOUNTER
"[facility] called to check on this patient and her spouse after the loss of their child. Patient states \"Larry\" has been buried. She states her breasts are swollen and very tender today and she went to see Dr. Delgado today for follow up. [facility] reminded her of the [facility] contact information and patient expressed good understanding over the phone.  "

## 2023-08-29 ENCOUNTER — TRANSCRIBE ORDERS (OUTPATIENT)
Dept: ADMINISTRATIVE | Facility: HOSPITAL | Age: 28
End: 2023-08-29
Payer: COMMERCIAL

## 2023-08-29 DIAGNOSIS — N92.6 IRREGULAR MENSES: Primary | ICD-10-CM

## 2023-09-12 ENCOUNTER — HOSPITAL ENCOUNTER (OUTPATIENT)
Dept: ULTRASOUND IMAGING | Facility: HOSPITAL | Age: 28
Discharge: HOME | End: 2023-09-12
Admitting: OBSTETRICS & GYNECOLOGY
Payer: COMMERCIAL

## 2023-09-12 DIAGNOSIS — N92.6 IRREGULAR MENSES: ICD-10-CM

## 2023-09-12 PROCEDURE — 76801 OB US < 14 WKS SINGLE FETUS: CPT

## 2023-11-12 ENCOUNTER — HOSPITAL ENCOUNTER (EMERGENCY)
Facility: HOSPITAL | Age: 28
Discharge: ANOTHER HOSPITAL | End: 2023-11-12
Attending: EMERGENCY MEDICINE | Admitting: EMERGENCY MEDICINE
Payer: COMMERCIAL

## 2023-11-12 ENCOUNTER — HOSPITAL ENCOUNTER (INPATIENT)
Facility: HOSPITAL | Age: 28
LOS: 1 days | Discharge: HOME | DRG: 779 | End: 2023-11-13
Attending: OBSTETRICS & GYNECOLOGY | Admitting: OBSTETRICS & GYNECOLOGY
Payer: COMMERCIAL

## 2023-11-12 ENCOUNTER — TELEPHONE (OUTPATIENT)
Dept: LABOR AND DELIVERY | Facility: HOSPITAL | Age: 28
End: 2023-11-12
Payer: COMMERCIAL

## 2023-11-12 ENCOUNTER — APPOINTMENT (OUTPATIENT)
Dept: ULTRASOUND IMAGING | Facility: HOSPITAL | Age: 28
End: 2023-11-12
Payer: COMMERCIAL

## 2023-11-12 VITALS
RESPIRATION RATE: 18 BRPM | WEIGHT: 149.25 LBS | TEMPERATURE: 99 F | OXYGEN SATURATION: 100 % | DIASTOLIC BLOOD PRESSURE: 76 MMHG | SYSTOLIC BLOOD PRESSURE: 114 MMHG | BODY MASS INDEX: 30.09 KG/M2 | HEIGHT: 59 IN | HEART RATE: 75 BPM

## 2023-11-12 DIAGNOSIS — O03.9 COMPLETE MISCARRIAGE: ICD-10-CM

## 2023-11-12 DIAGNOSIS — O34.31: Primary | ICD-10-CM

## 2023-11-12 PROBLEM — Z37.9 NORMAL LABOR: Status: ACTIVE | Noted: 2023-11-12

## 2023-11-12 LAB
ABO GROUP BLD: NORMAL
ABO GROUP BLD: NORMAL
BASOPHILS # BLD AUTO: 0.04 10*3/MM3 (ref 0–0.2)
BASOPHILS NFR BLD AUTO: 0.3 % (ref 0–1.5)
BLD GP AB SCN SERPL QL: NEGATIVE
BLD GP AB SCN SERPL QL: NEGATIVE
DEPRECATED RDW RBC AUTO: 39.2 FL (ref 37–54)
EOSINOPHIL # BLD AUTO: 0.16 10*3/MM3 (ref 0–0.4)
EOSINOPHIL NFR BLD AUTO: 1.1 % (ref 0.3–6.2)
ERYTHROCYTE [DISTWIDTH] IN BLOOD BY AUTOMATED COUNT: 12.9 % (ref 12.3–15.4)
HCG INTACT+B SERPL-ACNC: NORMAL MIU/ML
HCT VFR BLD AUTO: 37.3 % (ref 34–46.6)
HGB BLD-MCNC: 12.4 G/DL (ref 12–15.9)
HOLD SPECIMEN: NORMAL
HOLD SPECIMEN: NORMAL
IMM GRANULOCYTES # BLD AUTO: 0.07 10*3/MM3 (ref 0–0.05)
IMM GRANULOCYTES NFR BLD AUTO: 0.5 % (ref 0–0.5)
LYMPHOCYTES # BLD AUTO: 1.87 10*3/MM3 (ref 0.7–3.1)
LYMPHOCYTES NFR BLD AUTO: 12.5 % (ref 19.6–45.3)
MCH RBC QN AUTO: 28 PG (ref 26.6–33)
MCHC RBC AUTO-ENTMCNC: 33.2 G/DL (ref 31.5–35.7)
MCV RBC AUTO: 84.2 FL (ref 79–97)
MONOCYTES # BLD AUTO: 0.78 10*3/MM3 (ref 0.1–0.9)
MONOCYTES NFR BLD AUTO: 5.2 % (ref 5–12)
NEUTROPHILS NFR BLD AUTO: 12.07 10*3/MM3 (ref 1.7–7)
NEUTROPHILS NFR BLD AUTO: 80.4 % (ref 42.7–76)
NRBC BLD AUTO-RTO: 0 /100 WBC (ref 0–0.2)
NUMBER OF DOSES: NORMAL
PLATELET # BLD AUTO: 312 10*3/MM3 (ref 140–450)
PMV BLD AUTO: 8.8 FL (ref 6–12)
RBC # BLD AUTO: 4.43 10*6/MM3 (ref 3.77–5.28)
RH BLD: POSITIVE
RH BLD: POSITIVE
T&S EXPIRATION DATE: NORMAL
WBC NRBC COR # BLD: 14.99 10*3/MM3 (ref 3.4–10.8)
WHOLE BLOOD HOLD COAG: NORMAL
WHOLE BLOOD HOLD SPECIMEN: NORMAL

## 2023-11-12 PROCEDURE — 86850 RBC ANTIBODY SCREEN: CPT

## 2023-11-12 PROCEDURE — 84702 CHORIONIC GONADOTROPIN TEST: CPT | Performed by: EMERGENCY MEDICINE

## 2023-11-12 PROCEDURE — 86900 BLOOD TYPING SEROLOGIC ABO: CPT | Performed by: OBSTETRICS & GYNECOLOGY

## 2023-11-12 PROCEDURE — 25010000002 MORPHINE PER 10 MG: Performed by: OBSTETRICS & GYNECOLOGY

## 2023-11-12 PROCEDURE — 25810000003 LACTATED RINGERS PER 1000 ML: Performed by: OBSTETRICS & GYNECOLOGY

## 2023-11-12 PROCEDURE — 85025 COMPLETE CBC W/AUTO DIFF WBC: CPT | Performed by: EMERGENCY MEDICINE

## 2023-11-12 PROCEDURE — 86901 BLOOD TYPING SEROLOGIC RH(D): CPT

## 2023-11-12 PROCEDURE — 36415 COLL VENOUS BLD VENIPUNCTURE: CPT

## 2023-11-12 PROCEDURE — 99284 EMERGENCY DEPT VISIT MOD MDM: CPT

## 2023-11-12 PROCEDURE — 86901 BLOOD TYPING SEROLOGIC RH(D): CPT | Performed by: OBSTETRICS & GYNECOLOGY

## 2023-11-12 PROCEDURE — 86850 RBC ANTIBODY SCREEN: CPT | Performed by: OBSTETRICS & GYNECOLOGY

## 2023-11-12 PROCEDURE — 86900 BLOOD TYPING SEROLOGIC ABO: CPT

## 2023-11-12 PROCEDURE — 76815 OB US LIMITED FETUS(S): CPT

## 2023-11-12 RX ORDER — HYDROCODONE BITARTRATE AND ACETAMINOPHEN 5; 325 MG/1; MG/1
1 TABLET ORAL EVERY 4 HOURS PRN
Status: DISCONTINUED | OUTPATIENT
Start: 2023-11-12 | End: 2023-11-13 | Stop reason: HOSPADM

## 2023-11-12 RX ORDER — PRENATAL VIT/IRON FUM/FOLIC AC 27MG-0.8MG
TABLET ORAL DAILY
COMMUNITY
End: 2023-11-13 | Stop reason: HOSPADM

## 2023-11-12 RX ORDER — SODIUM CHLORIDE 0.9 % (FLUSH) 0.9 %
1-10 SYRINGE (ML) INJECTION AS NEEDED
Status: DISCONTINUED | OUTPATIENT
Start: 2023-11-12 | End: 2023-11-13 | Stop reason: HOSPADM

## 2023-11-12 RX ORDER — SODIUM CHLORIDE, SODIUM LACTATE, POTASSIUM CHLORIDE, CALCIUM CHLORIDE 600; 310; 30; 20 MG/100ML; MG/100ML; MG/100ML; MG/100ML
125 INJECTION, SOLUTION INTRAVENOUS CONTINUOUS
Status: DISCONTINUED | OUTPATIENT
Start: 2023-11-12 | End: 2023-11-13 | Stop reason: HOSPADM

## 2023-11-12 RX ORDER — CITRIC ACID/SODIUM CITRATE 334-500MG
30 SOLUTION, ORAL ORAL ONCE AS NEEDED
Status: DISCONTINUED | OUTPATIENT
Start: 2023-11-12 | End: 2023-11-13 | Stop reason: HOSPADM

## 2023-11-12 RX ORDER — ACETAMINOPHEN 325 MG/1
650 TABLET ORAL EVERY 4 HOURS PRN
Status: DISCONTINUED | OUTPATIENT
Start: 2023-11-12 | End: 2023-11-12

## 2023-11-12 RX ORDER — ONDANSETRON 4 MG/1
4 TABLET, FILM COATED ORAL EVERY 6 HOURS PRN
Status: DISCONTINUED | OUTPATIENT
Start: 2023-11-12 | End: 2023-11-13 | Stop reason: HOSPADM

## 2023-11-12 RX ORDER — MAGNESIUM CARB/ALUMINUM HYDROX 105-160MG
TABLET,CHEWABLE ORAL
Status: DISCONTINUED
Start: 2023-11-12 | End: 2023-11-12 | Stop reason: WASHOUT

## 2023-11-12 RX ORDER — MORPHINE SULFATE 2 MG/ML
2 INJECTION, SOLUTION INTRAMUSCULAR; INTRAVENOUS
Status: DISCONTINUED | OUTPATIENT
Start: 2023-11-12 | End: 2023-11-13 | Stop reason: HOSPADM

## 2023-11-12 RX ORDER — SODIUM CHLORIDE 0.9 % (FLUSH) 0.9 %
10 SYRINGE (ML) INJECTION AS NEEDED
Status: DISCONTINUED | OUTPATIENT
Start: 2023-11-12 | End: 2023-11-13 | Stop reason: HOSPADM

## 2023-11-12 RX ORDER — OXYTOCIN/0.9 % SODIUM CHLORIDE 30/500 ML
PLASTIC BAG, INJECTION (ML) INTRAVENOUS
Status: DISCONTINUED
Start: 2023-11-12 | End: 2023-11-12 | Stop reason: WASHOUT

## 2023-11-12 RX ORDER — ONDANSETRON 2 MG/ML
4 INJECTION INTRAMUSCULAR; INTRAVENOUS EVERY 6 HOURS PRN
Status: DISCONTINUED | OUTPATIENT
Start: 2023-11-12 | End: 2023-11-13 | Stop reason: HOSPADM

## 2023-11-12 RX ORDER — LIDOCAINE HYDROCHLORIDE 10 MG/ML
INJECTION, SOLUTION EPIDURAL; INFILTRATION; INTRACAUDAL; PERINEURAL
Status: DISCONTINUED
Start: 2023-11-12 | End: 2023-11-12 | Stop reason: WASHOUT

## 2023-11-12 RX ORDER — MISOPROSTOL 200 UG/1
TABLET ORAL
Status: COMPLETED
Start: 2023-11-12 | End: 2023-11-12

## 2023-11-12 RX ORDER — ACETAMINOPHEN 325 MG/1
650 TABLET ORAL EVERY 6 HOURS PRN
Status: DISCONTINUED | OUTPATIENT
Start: 2023-11-12 | End: 2023-11-13 | Stop reason: HOSPADM

## 2023-11-12 RX ORDER — HYDROCODONE BITARTRATE AND ACETAMINOPHEN 10; 325 MG/1; MG/1
1 TABLET ORAL EVERY 4 HOURS PRN
Status: DISCONTINUED | OUTPATIENT
Start: 2023-11-12 | End: 2023-11-13 | Stop reason: HOSPADM

## 2023-11-12 RX ORDER — BISACODYL 10 MG
10 SUPPOSITORY, RECTAL RECTAL DAILY PRN
Status: DISCONTINUED | OUTPATIENT
Start: 2023-11-13 | End: 2023-11-13 | Stop reason: HOSPADM

## 2023-11-12 RX ORDER — SODIUM CHLORIDE 0.9 % (FLUSH) 0.9 %
10 SYRINGE (ML) INJECTION AS NEEDED
Status: DISCONTINUED | OUTPATIENT
Start: 2023-11-12 | End: 2023-11-12 | Stop reason: HOSPADM

## 2023-11-12 RX ORDER — DOCUSATE SODIUM 100 MG/1
100 CAPSULE, LIQUID FILLED ORAL 2 TIMES DAILY
Status: DISCONTINUED | OUTPATIENT
Start: 2023-11-12 | End: 2023-11-13 | Stop reason: HOSPADM

## 2023-11-12 RX ORDER — CALCIUM CARBONATE 500 MG/1
2 TABLET, CHEWABLE ORAL 3 TIMES DAILY PRN
Status: DISCONTINUED | OUTPATIENT
Start: 2023-11-12 | End: 2023-11-13 | Stop reason: HOSPADM

## 2023-11-12 RX ORDER — LIDOCAINE HYDROCHLORIDE 10 MG/ML
0.5 INJECTION, SOLUTION INFILTRATION; PERINEURAL ONCE AS NEEDED
Status: DISCONTINUED | OUTPATIENT
Start: 2023-11-12 | End: 2023-11-13 | Stop reason: HOSPADM

## 2023-11-12 RX ORDER — SODIUM CHLORIDE 0.9 % (FLUSH) 0.9 %
10 SYRINGE (ML) INJECTION EVERY 12 HOURS SCHEDULED
Status: DISCONTINUED | OUTPATIENT
Start: 2023-11-12 | End: 2023-11-13 | Stop reason: HOSPADM

## 2023-11-12 RX ORDER — SODIUM CHLORIDE 9 MG/ML
40 INJECTION, SOLUTION INTRAVENOUS AS NEEDED
Status: DISCONTINUED | OUTPATIENT
Start: 2023-11-12 | End: 2023-11-13 | Stop reason: HOSPADM

## 2023-11-12 RX ORDER — IBUPROFEN 600 MG/1
600 TABLET ORAL EVERY 6 HOURS PRN
Status: DISCONTINUED | OUTPATIENT
Start: 2023-11-12 | End: 2023-11-13 | Stop reason: HOSPADM

## 2023-11-12 RX ORDER — KETOROLAC TROMETHAMINE 30 MG/ML
30 INJECTION, SOLUTION INTRAMUSCULAR; INTRAVENOUS ONCE
Status: DISCONTINUED | OUTPATIENT
Start: 2023-11-12 | End: 2023-11-13 | Stop reason: HOSPADM

## 2023-11-12 RX ADMIN — MISOPROSTOL 200 MCG: 200 TABLET ORAL at 20:22

## 2023-11-12 RX ADMIN — SODIUM CHLORIDE, POTASSIUM CHLORIDE, SODIUM LACTATE AND CALCIUM CHLORIDE 125 ML/HR: 600; 310; 30; 20 INJECTION, SOLUTION INTRAVENOUS at 21:25

## 2023-11-12 RX ADMIN — MORPHINE SULFATE 2 MG: 2 INJECTION, SOLUTION INTRAMUSCULAR; INTRAVENOUS at 19:40

## 2023-11-12 RX ADMIN — MORPHINE SULFATE 2 MG: 2 INJECTION, SOLUTION INTRAMUSCULAR; INTRAVENOUS at 18:32

## 2023-11-12 NOTE — ED PROVIDER NOTES
Time: 4:00 PM EST  Date of encounter:  11/12/2023  Independent Historian/Clinical History and Information was obtained by:   Patient    History is limited by: N/A    Chief Complaint   Patient presents with    Vaginal Bleeding - Pregnant         History of Present Illness:  Patient is a G2, P0  28 y.o. year old female who presents to the emergency department for evaluation of pregnancy pelvic cramping that started last night.  Patient states she is currently 15 weeks and 4 days.  Patient states that she used her Doppler at home and had a heart rate of 150 bpm noted.  She states that today around 3 PM she started having heavy vaginal bleeding.  She saw her OB/GYN last month and had ultrasound and states everything was normal.  She is scheduled to follow-up with her OBGYN November 16.  She has not taken any Tylenol and states that she tries to stay away from pain medication.  She denies bleeding disorders.  Patient states she does have a history of ovarian cyst.     Patient Care Team  Primary Care Provider: Provider, No Known    Past Medical History:     No Known Allergies  Past Medical History:   Diagnosis Date    Ovarian cyst     Pneumonia due to COVID-19 virus 9/16/2021     History reviewed. No pertinent surgical history.  Family History   Problem Relation Age of Onset    Hypertension Mother     Diabetes Maternal Grandmother        Home Medications:  Prior to Admission medications    Not on File        Social History:   Social History     Tobacco Use    Smoking status: Never    Smokeless tobacco: Never   Vaping Use    Vaping Use: Never used   Substance Use Topics    Alcohol use: Never    Drug use: Never         Review of Systems:  Review of Systems   Constitutional: Negative.    HENT: Negative.     Eyes: Negative.    Respiratory: Negative.     Cardiovascular: Negative.    Gastrointestinal: Negative.    Endocrine: Negative.    Genitourinary:  Positive for pelvic pain, vaginal bleeding and vaginal pain.  "  Musculoskeletal: Negative.    Skin: Negative.    Allergic/Immunologic: Negative.    Neurological: Negative.    Hematological: Negative.    Psychiatric/Behavioral: Negative.          Physical Exam:  /76   Pulse 75   Temp 99 °F (37.2 °C) (Oral)   Resp 18   Ht 149.9 cm (59\")   Wt 67.7 kg (149 lb 4 oz)   LMP 08/16/2023 (Approximate)   SpO2 100%   Breastfeeding No   BMI 30.15 kg/m²         Physical Exam  Vitals and nursing note reviewed.   Constitutional:       Appearance: Normal appearance. She is normal weight.   HENT:      Head: Normocephalic and atraumatic.      Nose: Nose normal.      Mouth/Throat:      Mouth: Mucous membranes are moist.   Eyes:      Extraocular Movements: Extraocular movements intact.      Conjunctiva/sclera: Conjunctivae normal.      Pupils: Pupils are equal, round, and reactive to light.   Cardiovascular:      Rate and Rhythm: Normal rate and regular rhythm.      Heart sounds: Normal heart sounds.   Pulmonary:      Effort: Pulmonary effort is normal.      Breath sounds: Normal breath sounds.   Abdominal:      General: Abdomen is flat.      Palpations: Abdomen is soft.      Tenderness: There is abdominal tenderness. There is no guarding or rebound.   Musculoskeletal:         General: Normal range of motion.      Cervical back: Normal range of motion and neck supple.   Skin:     General: Skin is warm and dry.   Neurological:      General: No focal deficit present.      Mental Status: She is alert and oriented to person, place, and time.   Psychiatric:         Mood and Affect: Mood normal.         Behavior: Behavior normal.                  Procedures:  Procedures      Medical Decision Making:      Comorbidities that affect care:    Ovarian cyst, Pregnancy    External Notes reviewed:    Previous ED Note: [facility] ED visit July 29, 2020.  Currently incompetent cervix second trimester      The following orders were placed and all results were independently analyzed by me:  Orders Placed " This Encounter   Procedures    US Ob Limited 1 + Fetuses    [place] Draw    hCG, Quantitative, Pregnancy    CBC Auto Differential    Undress & Gown    Vital Signs    Supplies To Bedside - Notify MD When Ready- Pelvic Cart / Set Up    Supplies To Bedside - Notify MD When Ready- Pelvic Cart / Set Up    Monitor fetal heart tones     RhIg Evaluation    Doses of Rh Immune Globulin    CBC & Differential    Green Top (Gel)    Lavender Top    Gold Top - SST    Light Blue Top       Medications Given in the Emergency Department:  Medications - No data to display       ED Course:    The patient was initially evaluated in the triage area where orders were placed. The patient was later dispositioned by Israel Newell PA-C.      The patient was advised to stay for completion of workup which includes but is not limited to communication of labs and radiological results, reassessment and plan. The patient was advised that leaving prior to disposition by a provider could result in critical findings that are not communicated to the patient.     ED Course as of 23 1015   Sun 2023   730 He was in the room getting the patient set up for a pelvic exam when ER tech charge nurse were in the room due to patient calling out stating that she believes her water broke.  Evaluated the patient there was a lot of fluid to vaginal area the yoel.     Patient states that she was laying there and felt like she needed to pee and a gush of fluid came out.    Ultrasound technician called me and states that she noted fetus in the cervix, called hospitalist OB/GYN to move patient upstairs. [AJ]   5986 Dr. Ochoa radiologist called and told me the results, discussed with him that I had already moved patient to L&D.  [AJ]      ED Course User Index  [AJ] Israel Newell PA-C       Labs:    Lab Results (last 24 hours)       ** No results found for the last 24 hours. **             Imaging:    No Radiology Exams Resulted  Within Past 24 Hours      Differential Diagnosis and Discussion:      Pelvic Pain: Differential diagnosis includes but is not limited to ectopic pregnancy, ovarian torsion, tubo-ovarian abscess, ovarian cyst, ovulation, oophoritis, abdominal pregnancy, appendicitis, diverticulitis, cystitis, and renal colic  Vaginal Bleeding: Differential diagnosis includes but is not limited to foreign body, tumor, vaginitis, dysfunctional uterine bleeding, endocrine abnormalities, coagulation disorder, systemic illness, polyps, complications of pregnancy (possible ectopic pregnancy).    All labs were reviewed and interpreted by me.  Ultrasound impression was interpreted by me.     MDM     Amount and/or Complexity of Data Reviewed  Clinical lab tests: reviewed  Tests in the radiology section of CPT®: reviewed  Decide to obtain previous medical records or to obtain history from someone other than the patient: yes                 Patient Care Considerations:    SEPSIS was considered but is NOT present in the emergency department as SIRS criteria is not present. MRI: I considered ordering an MRI however patient had generalized abdominal pain morning.  She had also complaints of vaginal pain      Consultants/Shared Management Plan:    Consultant: I have discussed the case with Dr. Donnelly, OB/GYN hospitalist who states to go ahead and move the patient to labor and delivery    Social Determinants of Health:    Patient is independent, reliable, and has access to care.       Disposition and Care Coordination:    Admit:   Through independent evaluation of the patient's history, physical, and imperical data, the patient meets criteria for observation/admission to the hospital.        Final diagnoses:   Incompetent cervix in pregnancy, first trimester   Complete miscarriage        ED Disposition       ED Disposition   Send to L&D    Condition   --    Comment   --               This medical record created using voice recognition  software.             Israel Newell PA-C  11/12/23 1734       Israel Newell PA-C  11/12/23 1808       Israel Newell PA-C  11/20/23 1015

## 2023-11-13 VITALS
OXYGEN SATURATION: 100 % | SYSTOLIC BLOOD PRESSURE: 97 MMHG | BODY MASS INDEX: 30.04 KG/M2 | HEART RATE: 65 BPM | HEIGHT: 59 IN | DIASTOLIC BLOOD PRESSURE: 62 MMHG | WEIGHT: 149 LBS | TEMPERATURE: 98.3 F

## 2023-11-13 PROCEDURE — 88305 TISSUE EXAM BY PATHOLOGIST: CPT | Performed by: OBSTETRICS & GYNECOLOGY

## 2023-11-13 NOTE — NURSING NOTE
Patients SO called out of room and said the patient is not feeling well and feels like she's having trouble breathing. Apon assessment V/S collected and laid patient on left side. Fluid bolus initiated. Dr Limon in the room to assess patient and bleeding. Orders given to Start 2nd IV, give fluid bolus, and continue to monitor. Patient reports feeling better V/S WNL.

## 2023-11-13 NOTE — H&P
[facility]  Obstetric History and Physical    Pelvic pain      Subjective     HPI:    Patient is a 28 y.o. female  currently at 15w4d, who presents with pelvic pain to ER and spotting;  pain started last night and bleeding today;  pt with h/o IC with 19wk delivery last year.  This pregnancy has been uncomp thus far.  Not had CL, cerclage or MFM appt.    Her prenatal care is benign.  Her previous obstetric/gynecological history is noted for is remarkable for h/o 19wk loss with IC    The following portions of the patients history were reviewed and updated as appropriate:   current medications, allergies, past medical history, past surgical history, past family history, past social history and current problem list.     Prenatal Information:  Prenatal Results       Initial Prenatal Labs       Test Value Reference Range Date Time    Hemoglobin  12.4 g/dL 12.0 - 15.9 23 1533    Hematocrit  37.3 % 34.0 - 46.6 23 1533    Platelets  312 10*3/mm3 140 - 450 23 1533    Rubella IgG  21.30 index Immune >0.99 22 1628    Hepatitis B SAg  Non-Reactive  Non-Reactive 22 1628    Hepatitis C Ab  Non-Reactive  Non-Reactive 22 1628    RPR  Non-Reactive  Non-Reactive 22 1628    T. Pallidum Ab         ABO  A   23 1801    Rh  Positive   23 1801    Antibody Screen  Negative   23 1801       Negative   23 1611    HIV  Non-Reactive  Non-Reactive 22 1628    Urine Culture  <25,000 CFU/mL Mixed Astrid Isolated   22 1559    Gonorrhea  Negative  Negative 22 1559    Chlamydia  Negative  Negative 22 1559    TSH        HgB A1c         Varicella IgG        HgB Electrophoresis         Cystic fibrosis                   Fetal testing        Test Value Reference Range Date Time    NIPT        MSAFP        AFP-4                  2nd and 3rd Trimester       Test Value Reference Range Date Time    Hemoglobin (repeated)        Hematocrit (repeated)        Platelets   312  10*3/mm3 140 - 450 11/12/23 1533    GCT        Antibody Screen (repeated)  Negative   11/12/23 1801       Negative   11/12/23 1611    GTT Fasting        GTT 1 Hr        GTT 2 Hr        GTT 3 Hr        Group B Strep                  Other testing        Test Value Reference Range Date Time    Parvo IgG         CMV IgG                   Drug Screening       Test Value Reference Range Date Time    Amphetamine Screen        Barbiturate Screen        Benzodiazepine Screen        Methadone Screen        Phencyclidine Screen        Opiates Screen        THC Screen        Cocaine Screen        Propoxyphene Screen        Buprenorphine Screen        Methamphetamine Screen        Oxycodone Screen        Tricyclic Antidepressants Screen                  Legend    ^: Historical                          External Prenatal Results       Pregnancy Outside Results - Transcribed From Office Records - See Scanned Records For Details       Test Value Date Time    ABO  A  11/12/23 1801    Rh  Positive  11/12/23 1801    Antibody Screen  Negative  11/12/23 1801       Negative  11/12/23 1611    Varicella IgG       Rubella  21.30 index 05/26/22 1628    Hgb  12.4 g/dL 11/12/23 1533    Hct  37.3 % 11/12/23 1533    Glucose Fasting GTT       Glucose Tolerance Test 1 hour       Glucose Tolerance Test 3 hour       Gonorrhea (discrete)  Negative  05/26/22 1559    Chlamydia (discrete)  Negative  05/26/22 1559    RPR  Non-Reactive  05/26/22 1628    VDRL       Syphilis Antibody       HBsAg  Non-Reactive  05/26/22 1628    Herpes Simplex Virus PCR       Herpes Simplex VIrus Culture       HIV  Non-Reactive  05/26/22 1628    Hep C RNA Quant PCR       Hep C Antibody  Non-Reactive  05/26/22 1628    AFP       Group B Strep       GBS Susceptibility to Clindamycin       GBS Susceptibility to Erythromycin       Fetal Fibronectin       Genetic Testing, Maternal Blood                 Drug Screening       Test Value Date Time    Urine Drug Screen       Amphetamine  Screen       Barbiturate Screen       Benzodiazepine Screen       Methadone Screen       Phencyclidine Screen       Opiates Screen       THC Screen       Cocaine Screen       Propoxyphene Screen       Buprenorphine Screen       Methamphetamine Screen       Oxycodone Screen       Tricyclic Antidepressants Screen                 Legend    ^: Historical                             Past OB History:     OB History    Para Term  AB Living   2 1 0 0 0 1   SAB IAB Ectopic Molar Multiple Live Births   0 0 0 0 0 1      # Outcome Date GA Lbr Sonido/2nd Weight Sex Delivery Anes PTL Lv   2 Current            1 Para 22 19w6d / 00:58 261 g (9.2 oz) M Vag-Spont None Y ND      Complications: Other, Incompetent cervix      Name: DONOVAN NEWTON      Apgar1: 2  Apgar5: 2       Past Medical History: Past Medical History:   Diagnosis Date    Ovarian cyst     Pneumonia due to COVID-19 virus 2021      Past Surgical History History reviewed. No pertinent surgical history.   Family History: Family History   Problem Relation Age of Onset    Hypertension Mother     Diabetes Maternal Grandmother       Social History:  reports that she has never smoked. She has never used smokeless tobacco.   reports no history of alcohol use.   reports no history of drug use.        General ROS: Pertinent items are noted in HPI  Home Medications:  prenatal vitamin 27-0.8    Allergies:  No Known Allergies    Objective       Vital Signs Range for the last 24 hours  Temperature: Temp:  [99 °F (37.2 °C)] 99 °F (37.2 °C)   Temp Source: Temp src: Oral   BP: BP: (113-114)/(67-76) 114/67   Pulse: Heart Rate:  [75-91] 81   Respirations: Resp:  [18] 18   SPO2: SpO2:  [100 %] 100 %     Physical Examination:   General appearance - alert, well appearing, and in no distress  Mental status - alert, oriented to person, place, and time  Abdomen - soft, nontender, nondistended,   Pelvic - normal external genitalia, vulva, vagina, cervix, and uterus  "  Back exam - full range of motion, no tenderness or pain on motion  Neurological - alert, oriented, normal speech  Extremities - peripheral pulses normal  Skin - normal coloration and turgor, no suspicious skin lesions noted    Presentation: Unknown, BBOW   Cervix: Method: sterile exam per physician   Dilation: Cervical Dilation (cm): 10   Effacement:     Station:         Fetal Heart Rate Assessment   Method:     Beats/min:     Baseline:     Variability:     Accels:     Decels:     Tracing Category:       Uterine Assessment   Method:     Frequency (min):     Ctx Count in 10 min:     Duration:     Intensity:     [place] Units:       GBS is unknown     US - fetus in cervix, FHT - 187    Assessment & Plan       Normal labor   Incompetent cervix    Assessment:  1.  Intrauterine pregnancy at 15w4d gestation with heart tones on US   2. Incompetent cvx and now labor  3.  Obstetrical history significant for is remarkable for h/o 19wk loss with IC  4.  GBS status: No results found for: \"STREPGPB\"    Plan:  1. expectant management, expect  soon;  pt with IC - WILL NEED PROPHYLACTIC CERCLAGE NEXT PREGNANCY  2.  Plan of care has been reviewed with patient and patient agrees.   3.  Risks, benefits of treatment plan have been discussed.  4.  All questions have been answered.        Electronically signed by Isabelle Limon MD, 23, 7:51 PM EST.    "

## 2023-11-13 NOTE — DISCHARGE SUMMARY
"[facility]  Delivery Discharge Summary    Patient Name: Princess KIMBERLY Young  : 1995  MRN: [id number]    Date of Admission: 2023  Date of Discharge:  2023   Primary Care Physician: Provider, No Known  Consults       No orders found for last 30 day(s).             Procedures:  2023  - Vaginal, Spontaneous      Admitting Diagnosis:  Normal labor [O80, Z37.9]    Discharge Diagnosis:  Same as Admitting plus:   Pregnancy at 15w4d - Delivered   Incompetent cvx    Delivery Summary     OB Surgeon:  Isabelle Limon MD  Anesthesia: None  Delivery Type:   Perineum: OBPERINEUM: Intact    Infant: child  infant;    Weight: 91 g (3.2 oz)     APGARS: 0  @ 1 minute / 0  @ 5 minutes     Venous Blood Gas: No results found for: \"PHCVEN\", \"BECVEN\"   Arterial Blood Gas: No results found for: \"PHCART\", \"BECART\"     Hospital Course     Hospital Course:    Patient is a 28 y.o.  who at 15w4d had a Vaginal delivery birth.  Her postpartum course was without complications.    On PPD # 1 she was ready for discharge.    She had normal lochia and pain well controlled with oral medications    Day of Discharge     Vital Signs:  Temp:  [98.2 °F (36.8 °C)-99 °F (37.2 °C)] 98.3 °F (36.8 °C)  Heart Rate:  [58-91] 65  Resp:  [18] 18  BP: ()/(43-81) 97/62    On the day of discharge POSTPARTUM pt tolerating a regular diet, ambulating, pain well controlled, urinating spontaneously and lochia appropriate.   Vital signs were stable and afebrile.  Exam was within normal limits.  Fundus was below umbilicus and nontender.  Meeting discharge criteria and desired discharge home.  Postpartum instructions and FU reviewed and questions answered.    Pertinent  and/or Most Recent Results     LAB RESULTS:   Lab Results   Component Value Date    WBC 14.99 (H) 2023    HGB 12.4 2023    HCT 37.3 2023     2023       Discharge Details        Discharge Medications        Stop These Medications  "     prenatal vitamin 27-0.8 27-0.8 MG tablet tablet              No Known Allergies    Discharge Disposition:   Home, self-care    Discharge Condition:  Good    Follow Up:  Dr. Delgado in 2 wk    Electronically signed by Isabelle Limon MD, 11/13/23, 7:22 AM EST.

## 2023-11-13 NOTE — PLAN OF CARE
Problem: Adult Inpatient Plan of Care  Goal: Plan of Care Review  Outcome: Ongoing, Progressing  Goal: Patient-Specific Goal (Individualized)  Outcome: Ongoing, Progressing  Goal: Absence of Hospital-Acquired Illness or Injury  Outcome: Ongoing, Progressing  Intervention: Prevent and Manage VTE (Venous Thromboembolism) Risk  Recent Flowsheet Documentation  Taken 11/12/2023 2245 by Lili Angela, RN  Activity Management: ambulated to bathroom  Goal: Optimal Comfort and Wellbeing  Outcome: Ongoing, Progressing  Goal: Readiness for Transition of Care  Outcome: Ongoing, Progressing     Problem: Bleeding (Labor)  Goal: Hemostasis  Outcome: Ongoing, Progressing     Problem: Change in Fetal Wellbeing (Labor)  Goal: Stable Fetal Wellbeing  Outcome: Ongoing, Progressing     Problem: Delayed Labor Progression (Labor)  Goal: Effective Progression to Delivery  Outcome: Ongoing, Progressing     Problem: Infection (Labor)  Goal: Absence of Infection Signs and Symptoms  Outcome: Ongoing, Progressing     Problem: Labor Pain (Labor)  Goal: Acceptable Pain Control  Outcome: Ongoing, Progressing     Problem: Uterine Tachysystole (Labor)  Goal: Normal Uterine Contraction Pattern  Outcome: Ongoing, Progressing     Problem: Adjustment to Role Transition (Postpartum Vaginal Delivery)  Goal: Successful Maternal Role Transition  Outcome: Ongoing, Progressing     Problem: Bleeding (Postpartum Vaginal Delivery)  Goal: Hemostasis  Outcome: Ongoing, Progressing     Problem: Infection (Postpartum Vaginal Delivery)  Goal: Absence of Infection Signs/Symptoms  Outcome: Ongoing, Progressing     Problem: Pain (Postpartum Vaginal Delivery)  Goal: Acceptable Pain Control  Outcome: Ongoing, Progressing     Problem: Urinary Retention (Postpartum Vaginal Delivery)  Goal: Effective Urinary Elimination  Outcome: Ongoing, Progressing   Goal Outcome Evaluation:

## 2023-11-13 NOTE — PROGRESS NOTES
" [facility]  Vaginal Delivery Progress Note    Subjective   Postpartum Day 1: Vaginal Delivery    The patient feels well.  Her pain is well controlled with nonsteroidal anti-inflammatory drugs and Tylenol.   She is ambulating well.  Patient describes her bleeding as moderate lochia.      Objective     Vital Signs Range for the last 24 hours  Temperature: Temp:  [98.2 °F (36.8 °C)-99 °F (37.2 °C)] 98.3 °F (36.8 °C)   Temp Source: Temp src: Oral   BP: BP: ()/(43-81) 97/62   Pulse: Heart Rate:  [58-91] 65   Respirations: Resp:  [18] 18       Physical Exam:  General:  no acute distress.  Abdomen: abdomen is soft without significant tenderness, masses, organomegaly or guarding.   Fundus: appropriate, firm, non tender, small lochia , approx 8wk size  Extremities: normal, atraumatic, no cyanosis, and trace edema.     Rubella: No results found for: \"RUBELLAABIGG\"  Rh Status:    RH type   Date Value Ref Range Status   11/12/2023 Positive  Final     Immunizations:   Immunization History   Administered Date(s) Administered    Hep B, Adolescent or Pediatric 09/27/2007    IPV 09/27/2007    MMR 09/27/2007    Tdap 09/27/2007       Assessment & Plan     Incompetent cvx  stillborn      Princess KIMBERLY Young is Day 1  post-partum  Vaginal, Spontaneous   .      Plan:  Continue current care.  Will d/c home      Electronically signed by Isabelle Limon MD, 11/13/23, 7:19 AM EST.    "

## 2023-11-13 NOTE — L&D DELIVERY NOTE
" [facility]  Vaginal Delivery Note    Delivery     Delivery:      Date of Birth:  [birth year]   Time of Birth:  Gestational Age 1921  15w4d     Anesthesia:   NONE   Delivering clinician:  Dr. Limon   Forceps?   No   Vacuum? No    Shoulder dystocia present: No        Delivery narrative:  I was called to the room as the patient felt something pass.  Looking in the bedpan was a fetus delivered en caul.  The placenta was delivered together with baby.  The bag of water was elevated and cut open.  The baby was removed and cord cut;  the baby was placed in a blanket and placed on moms chest.  Hemostasis was noted.  Uterus was very sm and firm    Infant    Findings: fetus     Infant observations: Weight: .091gm  Length:   in  Observations/Comments:        Apgars: 0  @ 1 minute /    0  @ 5 minutes         Placenta, Cord, and Fluid    Placenta delivered    at        Cord:   present.   Nuchal Cord?  no   Cord blood obtained:     Cord gases obtained:      Cord gas results: Venous:  No results found for: \"PHCVEN\", \"BECVEN\"    Arterial:  No results found for: \"PHCART\", \"BECART\"     Repair    Episiotomy: Not recorded     No    Lacerations: No   Estimated Blood Loss:  100cc     QBL:           Complications  none    Disposition  Mother to Mother Baby/Postpartum  in stable condition currently.  Baby to remains with mom in stable condition currently.    Electronically signed by Isabelle Limon MD, 23, 7:58 PM EST.     "

## 2023-11-13 NOTE — PROGRESS NOTES
Called by RN for increasing pain and bleeding;  approx 200cc clot on pad;  bimanual done with another 200cc removed;  pt felt much better;  cytotec 200mcg given pr.  If continues, will need S&C

## 2023-11-16 ENCOUNTER — TELEPHONE (OUTPATIENT)
Dept: LABOR AND DELIVERY | Facility: HOSPITAL | Age: 28
End: 2023-11-16
Payer: COMMERCIAL

## 2023-11-16 NOTE — TELEPHONE ENCOUNTER
[facility] called to talk to patient regarding her most recent loss. She states they buried Peter yesterday. FSN discussed ways [facility] may be helpful for her. She states she is doing okay and understands how to reach [facility]SN if needed.

## 2023-11-27 ENCOUNTER — TRANSCRIBE ORDERS (OUTPATIENT)
Dept: LAB | Facility: HOSPITAL | Age: 28
End: 2023-11-27
Payer: COMMERCIAL

## 2023-11-27 DIAGNOSIS — O03.9 SAB (SPONTANEOUS ABORTION): Primary | ICD-10-CM
